# Patient Record
Sex: MALE | Race: BLACK OR AFRICAN AMERICAN | Employment: UNEMPLOYED | URBAN - METROPOLITAN AREA
[De-identification: names, ages, dates, MRNs, and addresses within clinical notes are randomized per-mention and may not be internally consistent; named-entity substitution may affect disease eponyms.]

---

## 2019-01-24 ENCOUNTER — HOSPITAL ENCOUNTER (OUTPATIENT)
Age: 24
Setting detail: SPECIMEN
Discharge: HOME | End: 2019-01-24
Payer: MEDICARE

## 2019-01-24 LAB
ABSOLUTE EOS #: 0.18 K/UL (ref 0–0.44)
ABSOLUTE IMMATURE GRANULOCYTE: <0.03 K/UL (ref 0–0.3)
ABSOLUTE LYMPH #: 1.77 K/UL (ref 1.1–3.7)
ABSOLUTE MONO #: 0.67 K/UL (ref 0.1–1.2)
ALBUMIN SERPL-MCNC: 4.7 G/DL (ref 3.5–5.2)
ALBUMIN/GLOBULIN RATIO: 1.6 (ref 1–2.5)
ALP BLD-CCNC: 82 U/L (ref 40–129)
ALT SERPL-CCNC: 27 U/L (ref 5–41)
ANION GAP SERPL CALCULATED.3IONS-SCNC: 15 MMOL/L (ref 9–17)
AST SERPL-CCNC: 18 U/L
BASOPHILS # BLD: 1 % (ref 0–2)
BASOPHILS ABSOLUTE: 0.05 K/UL (ref 0–0.2)
BILIRUB SERPL-MCNC: 0.51 MG/DL (ref 0.3–1.2)
BUN BLDV-MCNC: 10 MG/DL (ref 6–20)
BUN/CREAT BLD: NORMAL (ref 9–20)
CALCIUM SERPL-MCNC: 9.4 MG/DL (ref 8.6–10.4)
CHLORIDE BLD-SCNC: 103 MMOL/L (ref 98–107)
CHOLESTEROL/HDL RATIO: 4.5
CHOLESTEROL: 159 MG/DL
CO2: 25 MMOL/L (ref 20–31)
CREAT SERPL-MCNC: 0.99 MG/DL (ref 0.7–1.2)
DIFFERENTIAL TYPE: ABNORMAL
EOSINOPHILS RELATIVE PERCENT: 2 % (ref 1–4)
GFR AFRICAN AMERICAN: >60 ML/MIN
GFR NON-AFRICAN AMERICAN: >60 ML/MIN
GFR SERPL CREATININE-BSD FRML MDRD: NORMAL ML/MIN/{1.73_M2}
GFR SERPL CREATININE-BSD FRML MDRD: NORMAL ML/MIN/{1.73_M2}
GLUCOSE BLD-MCNC: 80 MG/DL (ref 70–99)
HCT VFR BLD CALC: 46.3 % (ref 40.7–50.3)
HDLC SERPL-MCNC: 35 MG/DL
HEMOGLOBIN: 14.7 G/DL (ref 13–17)
HEPATITIS C ANTIBODY: NONREACTIVE
IMMATURE GRANULOCYTES: 0 %
LDL CHOLESTEROL: 110 MG/DL (ref 0–130)
LYMPHOCYTES # BLD: 20 % (ref 24–43)
MCH RBC QN AUTO: 29.4 PG (ref 25.2–33.5)
MCHC RBC AUTO-ENTMCNC: 31.7 G/DL (ref 28.4–34.8)
MCV RBC AUTO: 92.6 FL (ref 82.6–102.9)
MONOCYTES # BLD: 8 % (ref 3–12)
NRBC AUTOMATED: 0 PER 100 WBC
PDW BLD-RTO: 13.4 % (ref 11.8–14.4)
PLATELET # BLD: 273 K/UL (ref 138–453)
PLATELET ESTIMATE: ABNORMAL
PMV BLD AUTO: 10 FL (ref 8.1–13.5)
POTASSIUM SERPL-SCNC: 4.2 MMOL/L (ref 3.7–5.3)
RBC # BLD: 5 M/UL (ref 4.21–5.77)
RBC # BLD: ABNORMAL 10*6/UL
SEDIMENTATION RATE, ERYTHROCYTE: 9 MM (ref 0–10)
SEG NEUTROPHILS: 69 % (ref 36–65)
SEGMENTED NEUTROPHILS ABSOLUTE COUNT: 5.97 K/UL (ref 1.5–8.1)
SODIUM BLD-SCNC: 143 MMOL/L (ref 135–144)
TOTAL CK: 356 U/L (ref 39–308)
TOTAL PROTEIN: 7.6 G/DL (ref 6.4–8.3)
TRIGL SERPL-MCNC: 70 MG/DL
TSH SERPL DL<=0.05 MIU/L-ACNC: 1.14 MIU/L (ref 0.3–5)
VLDLC SERPL CALC-MCNC: ABNORMAL MG/DL (ref 1–30)
WBC # BLD: 8.7 K/UL (ref 3.5–11.3)
WBC # BLD: ABNORMAL 10*3/UL

## 2019-01-25 LAB
ANTI-NUCLEAR ANTIBODY (ANA): NEGATIVE
CK MB: 1.5 NG/ML
RHEUMATOID FACTOR: 10.9 IU/ML

## 2019-01-29 LAB
DIRECT EXAM: NORMAL
Lab: NORMAL
SPECIMEN DESCRIPTION: NORMAL
STATUS: NORMAL

## 2020-06-03 ENCOUNTER — HOSPITAL ENCOUNTER (EMERGENCY)
Age: 25
Discharge: HOME | End: 2020-06-03
Payer: MEDICAID

## 2020-06-03 VITALS
BODY MASS INDEX: 32.45 KG/M2 | RESPIRATION RATE: 18 BRPM | HEIGHT: 75 IN | WEIGHT: 261 LBS | DIASTOLIC BLOOD PRESSURE: 84 MMHG | SYSTOLIC BLOOD PRESSURE: 156 MMHG | OXYGEN SATURATION: 97 % | HEART RATE: 96 BPM | TEMPERATURE: 97.1 F

## 2020-06-03 LAB
GROUP A STREP CULTURE, REFLEX: POSITIVE
REFLEX THROAT C + S: NORMAL

## 2020-06-03 PROCEDURE — 99213 OFFICE O/P EST LOW 20 MIN: CPT

## 2020-06-03 PROCEDURE — 87880 STREP A ASSAY W/OPTIC: CPT

## 2020-06-03 PROCEDURE — 6370000000 HC RX 637 (ALT 250 FOR IP): Performed by: NURSE PRACTITIONER

## 2020-06-03 PROCEDURE — 99202 OFFICE O/P NEW SF 15 MIN: CPT | Performed by: NURSE PRACTITIONER

## 2020-06-03 RX ORDER — AZELASTINE 1 MG/ML
1 SPRAY, METERED NASAL 2 TIMES DAILY
Qty: 1 BOTTLE | Refills: 0 | Status: SHIPPED | OUTPATIENT
Start: 2020-06-03 | End: 2020-06-22 | Stop reason: ALTCHOICE

## 2020-06-03 RX ORDER — AZITHROMYCIN 250 MG/1
TABLET, FILM COATED ORAL
Qty: 6 TABLET | Refills: 0 | Status: ON HOLD | OUTPATIENT
Start: 2020-06-03 | End: 2020-06-07

## 2020-06-03 RX ORDER — LIDOCAINE HYDROCHLORIDE 20 MG/ML
15 SOLUTION OROPHARYNGEAL ONCE
Status: COMPLETED | OUTPATIENT
Start: 2020-06-03 | End: 2020-06-03

## 2020-06-03 RX ORDER — PHENIRAMINE/PHENYLEPHRINE/DM 20-10-20MG
POWDER IN PACKET (EA) ORAL DAILY PRN
Status: ON HOLD | COMMUNITY
End: 2020-06-10 | Stop reason: HOSPADM

## 2020-06-03 RX ORDER — ACETAMINOPHEN 500 MG
500 TABLET ORAL EVERY 6 HOURS PRN
Qty: 30 TABLET | Refills: 0 | Status: SHIPPED | OUTPATIENT
Start: 2020-06-03

## 2020-06-03 RX ADMIN — IBUPROFEN 200 MG: 200 SUSPENSION ORAL at 10:44

## 2020-06-03 RX ADMIN — LIDOCAINE HYDROCHLORIDE 15 ML: 20 SOLUTION ORAL; TOPICAL at 10:44

## 2020-06-03 ASSESSMENT — ENCOUNTER SYMPTOMS
NAUSEA: 0
ABDOMINAL DISTENTION: 0
VOMITING: 0
SINUS PAIN: 0
RHINORRHEA: 0
CONSTIPATION: 0
SHORTNESS OF BREATH: 0
ABDOMINAL PAIN: 0
COUGH: 0
SINUS PRESSURE: 0
VOICE CHANGE: 0
DIARRHEA: 0
CHOKING: 0
BLOOD IN STOOL: 0
APNEA: 0
ANAL BLEEDING: 0
STRIDOR: 0
SWOLLEN GLANDS: 1
WHEEZING: 0
CHEST TIGHTNESS: 0
TROUBLE SWALLOWING: 1
SORE THROAT: 1

## 2020-06-03 ASSESSMENT — PAIN SCALES - GENERAL: PAINLEVEL_OUTOF10: 8

## 2020-06-03 ASSESSMENT — PAIN DESCRIPTION - PAIN TYPE: TYPE: ACUTE PAIN

## 2020-06-03 ASSESSMENT — PAIN DESCRIPTION - DESCRIPTORS: DESCRIPTORS: ACHING;SORE

## 2020-06-03 ASSESSMENT — ACTIVITIES OF DAILY LIVING (ADL): EFFECT OF PAIN ON DAILY ACTIVITIES: HURTS TO SWALLOW

## 2020-06-03 ASSESSMENT — PAIN DESCRIPTION - FREQUENCY: FREQUENCY: INTERMITTENT

## 2020-06-03 ASSESSMENT — PAIN DESCRIPTION - LOCATION: LOCATION: THROAT

## 2020-06-03 NOTE — ED NOTES
Discharge instructions and prescriptions reviewed with pt. Pt verbalized understanding. Pt ambulated out in stable condition. Assessment unchanged upon discharge.      Matilde Lisa RN  06/03/20 7895

## 2020-06-04 ENCOUNTER — CARE COORDINATION (OUTPATIENT)
Dept: CARE COORDINATION | Age: 25
End: 2020-06-04

## 2020-06-06 ENCOUNTER — CARE COORDINATION (OUTPATIENT)
Dept: OTHER | Facility: CLINIC | Age: 25
End: 2020-06-06

## 2020-06-06 NOTE — CARE COORDINATION
8    Additional Resources: You may be feeling anxious, depressed or overwhelmed. If you urgently need to talk or  text with someone, please reach out to the following resources:  Contact [employer] [phone] for links to local resources or support. Use code BSM[facility]1. https://Prepmatic2. Contour/portal/welcome/sso     Call The [employer] Administration's Disaster  Distress Helpline: 2-399-755-744  [facility] Line: 3-123.515.2473    Text  TalkWithUs to 05307 (Krystal Martinez: [phone]), or  TALK to 214103

## 2020-06-07 ENCOUNTER — ANESTHESIA EVENT (OUTPATIENT)
Dept: OPERATING ROOM | Age: 25
DRG: 097 | End: 2020-06-07
Payer: MEDICAID

## 2020-06-07 ENCOUNTER — HOSPITAL ENCOUNTER (OUTPATIENT)
Dept: CT IMAGING | Age: 25
Discharge: HOME | End: 2020-06-07

## 2020-06-07 ENCOUNTER — HOSPITAL ENCOUNTER (INPATIENT)
Age: 25
LOS: 3 days | Discharge: HOME | DRG: 097 | End: 2020-06-10
Attending: OTOLARYNGOLOGY | Admitting: OTOLARYNGOLOGY
Payer: MEDICAID

## 2020-06-07 ENCOUNTER — ANESTHESIA (OUTPATIENT)
Dept: OPERATING ROOM | Age: 25
DRG: 097 | End: 2020-06-07
Payer: MEDICAID

## 2020-06-07 VITALS — SYSTOLIC BLOOD PRESSURE: 114 MMHG | OXYGEN SATURATION: 100 % | DIASTOLIC BLOOD PRESSURE: 57 MMHG

## 2020-06-07 PROBLEM — Z90.89 S/P TONSILLECTOMY: Status: ACTIVE | Noted: 2020-06-07

## 2020-06-07 PROCEDURE — 2060000000 HC ICU INTERMEDIATE R&B

## 2020-06-07 PROCEDURE — 42826 REMOVAL OF TONSILS: CPT | Performed by: OTOLARYNGOLOGY

## 2020-06-07 PROCEDURE — 6360000002 HC RX W HCPCS: Performed by: NURSE ANESTHETIST, CERTIFIED REGISTERED

## 2020-06-07 PROCEDURE — 0C9PXZZ DRAINAGE OF TONSILS, EXTERNAL APPROACH: ICD-10-PCS | Performed by: OTOLARYNGOLOGY

## 2020-06-07 PROCEDURE — 42700 I&D ABSCESS PERITONSILLAR: CPT | Performed by: OTOLARYNGOLOGY

## 2020-06-07 PROCEDURE — 6360000002 HC RX W HCPCS: Performed by: ANESTHESIOLOGY

## 2020-06-07 PROCEDURE — 3600000002 HC SURGERY LEVEL 2 BASE: Performed by: OTOLARYNGOLOGY

## 2020-06-07 PROCEDURE — 3600000012 HC SURGERY LEVEL 2 ADDTL 15MIN: Performed by: OTOLARYNGOLOGY

## 2020-06-07 PROCEDURE — 2709999900 HC NON-CHARGEABLE SUPPLY: Performed by: OTOLARYNGOLOGY

## 2020-06-07 PROCEDURE — 87205 SMEAR GRAM STAIN: CPT

## 2020-06-07 PROCEDURE — 2500000003 HC RX 250 WO HCPCS: Performed by: NURSE ANESTHETIST, CERTIFIED REGISTERED

## 2020-06-07 PROCEDURE — 7100000001 HC PACU RECOVERY - ADDTL 15 MIN: Performed by: OTOLARYNGOLOGY

## 2020-06-07 PROCEDURE — 7100000000 HC PACU RECOVERY - FIRST 15 MIN: Performed by: OTOLARYNGOLOGY

## 2020-06-07 PROCEDURE — 2709999900 HC NON-CHARGEABLE SUPPLY

## 2020-06-07 PROCEDURE — 6360000002 HC RX W HCPCS: Performed by: PHYSICIAN ASSISTANT

## 2020-06-07 PROCEDURE — 3700000000 HC ANESTHESIA ATTENDED CARE: Performed by: OTOLARYNGOLOGY

## 2020-06-07 PROCEDURE — 3209999900 CT COMPARISON OF OUTSIDE FILMS

## 2020-06-07 PROCEDURE — 3700000001 HC ADD 15 MINUTES (ANESTHESIA): Performed by: OTOLARYNGOLOGY

## 2020-06-07 PROCEDURE — 88304 TISSUE EXAM BY PATHOLOGIST: CPT

## 2020-06-07 PROCEDURE — 87070 CULTURE OTHR SPECIMN AEROBIC: CPT

## 2020-06-07 PROCEDURE — 2580000003 HC RX 258: Performed by: PHYSICIAN ASSISTANT

## 2020-06-07 PROCEDURE — 99222 1ST HOSP IP/OBS MODERATE 55: CPT | Performed by: PHYSICIAN ASSISTANT

## 2020-06-07 PROCEDURE — 87077 CULTURE AEROBIC IDENTIFY: CPT

## 2020-06-07 PROCEDURE — 2580000003 HC RX 258: Performed by: NURSE ANESTHETIST, CERTIFIED REGISTERED

## 2020-06-07 PROCEDURE — 87075 CULTR BACTERIA EXCEPT BLOOD: CPT

## 2020-06-07 PROCEDURE — 2720000010 HC SURG SUPPLY STERILE: Performed by: OTOLARYNGOLOGY

## 2020-06-07 PROCEDURE — 0CTPXZZ RESECTION OF TONSILS, EXTERNAL APPROACH: ICD-10-PCS | Performed by: OTOLARYNGOLOGY

## 2020-06-07 PROCEDURE — 99284 EMERGENCY DEPT VISIT MOD MDM: CPT

## 2020-06-07 RX ORDER — LABETALOL 20 MG/4 ML (5 MG/ML) INTRAVENOUS SYRINGE
5 EVERY 10 MIN PRN
Status: DISCONTINUED | OUTPATIENT
Start: 2020-06-07 | End: 2020-06-07 | Stop reason: HOSPADM

## 2020-06-07 RX ORDER — FENTANYL CITRATE 50 UG/ML
25 INJECTION, SOLUTION INTRAMUSCULAR; INTRAVENOUS EVERY 5 MIN PRN
Status: DISCONTINUED | OUTPATIENT
Start: 2020-06-07 | End: 2020-06-07 | Stop reason: HOSPADM

## 2020-06-07 RX ORDER — MEPERIDINE HYDROCHLORIDE 25 MG/ML
INJECTION INTRAMUSCULAR; INTRAVENOUS; SUBCUTANEOUS
Status: DISPENSED
Start: 2020-06-07 | End: 2020-06-08

## 2020-06-07 RX ORDER — FENTANYL CITRATE 50 UG/ML
50 INJECTION, SOLUTION INTRAMUSCULAR; INTRAVENOUS EVERY 5 MIN PRN
Status: DISCONTINUED | OUTPATIENT
Start: 2020-06-07 | End: 2020-06-07 | Stop reason: HOSPADM

## 2020-06-07 RX ORDER — GLYCOPYRROLATE 1 MG/5 ML
SYRINGE (ML) INTRAVENOUS PRN
Status: DISCONTINUED | OUTPATIENT
Start: 2020-06-07 | End: 2020-06-07 | Stop reason: SDUPTHER

## 2020-06-07 RX ORDER — ONDANSETRON 2 MG/ML
INJECTION INTRAMUSCULAR; INTRAVENOUS PRN
Status: DISCONTINUED | OUTPATIENT
Start: 2020-06-07 | End: 2020-06-07 | Stop reason: SDUPTHER

## 2020-06-07 RX ORDER — FENTANYL CITRATE 50 UG/ML
INJECTION, SOLUTION INTRAMUSCULAR; INTRAVENOUS PRN
Status: DISCONTINUED | OUTPATIENT
Start: 2020-06-07 | End: 2020-06-07 | Stop reason: SDUPTHER

## 2020-06-07 RX ORDER — SODIUM CHLORIDE, SODIUM LACTATE, POTASSIUM CHLORIDE, CALCIUM CHLORIDE 600; 310; 30; 20 MG/100ML; MG/100ML; MG/100ML; MG/100ML
INJECTION, SOLUTION INTRAVENOUS CONTINUOUS PRN
Status: DISCONTINUED | OUTPATIENT
Start: 2020-06-07 | End: 2020-06-07 | Stop reason: SDUPTHER

## 2020-06-07 RX ORDER — MIDAZOLAM HYDROCHLORIDE 1 MG/ML
INJECTION INTRAMUSCULAR; INTRAVENOUS PRN
Status: DISCONTINUED | OUTPATIENT
Start: 2020-06-07 | End: 2020-06-07 | Stop reason: SDUPTHER

## 2020-06-07 RX ORDER — PROPOFOL 10 MG/ML
INJECTION, EMULSION INTRAVENOUS PRN
Status: DISCONTINUED | OUTPATIENT
Start: 2020-06-07 | End: 2020-06-07 | Stop reason: SDUPTHER

## 2020-06-07 RX ORDER — DEXAMETHASONE SODIUM PHOSPHATE 4 MG/ML
8 INJECTION, SOLUTION INTRA-ARTICULAR; INTRALESIONAL; INTRAMUSCULAR; INTRAVENOUS; SOFT TISSUE EVERY 12 HOURS
Status: COMPLETED | OUTPATIENT
Start: 2020-06-07 | End: 2020-06-08

## 2020-06-07 RX ORDER — ROCURONIUM BROMIDE 10 MG/ML
INJECTION, SOLUTION INTRAVENOUS PRN
Status: DISCONTINUED | OUTPATIENT
Start: 2020-06-07 | End: 2020-06-07 | Stop reason: SDUPTHER

## 2020-06-07 RX ORDER — LIDOCAINE HYDROCHLORIDE 20 MG/ML
INJECTION, SOLUTION INTRAVENOUS PRN
Status: DISCONTINUED | OUTPATIENT
Start: 2020-06-07 | End: 2020-06-07 | Stop reason: SDUPTHER

## 2020-06-07 RX ORDER — SUCCINYLCHOLINE/SOD CL,ISO/PF 200MG/10ML
SYRINGE (ML) INTRAVENOUS PRN
Status: DISCONTINUED | OUTPATIENT
Start: 2020-06-07 | End: 2020-06-07 | Stop reason: SDUPTHER

## 2020-06-07 RX ORDER — ONDANSETRON 2 MG/ML
4 INJECTION INTRAMUSCULAR; INTRAVENOUS
Status: DISCONTINUED | OUTPATIENT
Start: 2020-06-07 | End: 2020-06-07 | Stop reason: HOSPADM

## 2020-06-07 RX ORDER — SODIUM CHLORIDE 9 MG/ML
INJECTION, SOLUTION INTRAVENOUS CONTINUOUS
Status: DISCONTINUED | OUTPATIENT
Start: 2020-06-07 | End: 2020-06-07

## 2020-06-07 RX ORDER — PROMETHAZINE HYDROCHLORIDE 25 MG/ML
6.25 INJECTION, SOLUTION INTRAMUSCULAR; INTRAVENOUS
Status: DISCONTINUED | OUTPATIENT
Start: 2020-06-07 | End: 2020-06-07 | Stop reason: HOSPADM

## 2020-06-07 RX ORDER — SODIUM CHLORIDE 9 MG/ML
INJECTION, SOLUTION INTRAVENOUS CONTINUOUS
Status: DISCONTINUED | OUTPATIENT
Start: 2020-06-07 | End: 2020-06-10 | Stop reason: HOSPADM

## 2020-06-07 RX ORDER — DEXAMETHASONE SODIUM PHOSPHATE 4 MG/ML
INJECTION, SOLUTION INTRA-ARTICULAR; INTRALESIONAL; INTRAMUSCULAR; INTRAVENOUS; SOFT TISSUE PRN
Status: DISCONTINUED | OUTPATIENT
Start: 2020-06-07 | End: 2020-06-07 | Stop reason: SDUPTHER

## 2020-06-07 RX ORDER — VANCOMYCIN HYDROCHLORIDE 1 G/20ML
INJECTION, POWDER, LYOPHILIZED, FOR SOLUTION INTRAVENOUS PRN
Status: DISCONTINUED | OUTPATIENT
Start: 2020-06-07 | End: 2020-06-07 | Stop reason: SDUPTHER

## 2020-06-07 RX ORDER — MEPERIDINE HYDROCHLORIDE 25 MG/ML
12.5 INJECTION INTRAMUSCULAR; INTRAVENOUS; SUBCUTANEOUS EVERY 5 MIN PRN
Status: DISCONTINUED | OUTPATIENT
Start: 2020-06-07 | End: 2020-06-07 | Stop reason: HOSPADM

## 2020-06-07 RX ADMIN — Medication 0.4 MG: at 14:26

## 2020-06-07 RX ADMIN — ONDANSETRON HYDROCHLORIDE 4 MG: 4 INJECTION, SOLUTION INTRAMUSCULAR; INTRAVENOUS at 14:33

## 2020-06-07 RX ADMIN — ROCURONIUM BROMIDE 20 MG: 10 INJECTION INTRAVENOUS at 14:49

## 2020-06-07 RX ADMIN — MEPERIDINE HYDROCHLORIDE 12.5 MG: 25 INJECTION INTRAMUSCULAR; INTRAVENOUS; SUBCUTANEOUS at 16:05

## 2020-06-07 RX ADMIN — MIDAZOLAM HYDROCHLORIDE 2 MG: 1 INJECTION, SOLUTION INTRAMUSCULAR; INTRAVENOUS at 14:15

## 2020-06-07 RX ADMIN — DEXAMETHASONE SODIUM PHOSPHATE 8 MG: 4 INJECTION, SOLUTION INTRA-ARTICULAR; INTRALESIONAL; INTRAMUSCULAR; INTRAVENOUS; SOFT TISSUE at 18:47

## 2020-06-07 RX ADMIN — FENTANYL CITRATE 100 MCG: 50 INJECTION, SOLUTION INTRAMUSCULAR; INTRAVENOUS at 14:18

## 2020-06-07 RX ADMIN — DEXAMETHASONE SODIUM PHOSPHATE 8 MG: 4 INJECTION, SOLUTION INTRAMUSCULAR; INTRAVENOUS at 14:33

## 2020-06-07 RX ADMIN — SODIUM CHLORIDE: 9 INJECTION, SOLUTION INTRAVENOUS at 21:09

## 2020-06-07 RX ADMIN — SODIUM CHLORIDE: 9 INJECTION, SOLUTION INTRAVENOUS at 17:41

## 2020-06-07 RX ADMIN — ROCURONIUM BROMIDE 30 MG: 10 INJECTION INTRAVENOUS at 14:27

## 2020-06-07 RX ADMIN — LIDOCAINE HYDROCHLORIDE 100 MG: 20 INJECTION, SOLUTION INTRAVENOUS at 14:18

## 2020-06-07 RX ADMIN — FENTANYL CITRATE 100 MCG: 50 INJECTION, SOLUTION INTRAMUSCULAR; INTRAVENOUS at 14:42

## 2020-06-07 RX ADMIN — SUGAMMADEX 200 MG: 100 INJECTION, SOLUTION INTRAVENOUS at 15:46

## 2020-06-07 RX ADMIN — AMPICILLIN SODIUM AND SULBACTAM SODIUM 3 G: 2; 1 INJECTION, POWDER, FOR SOLUTION INTRAMUSCULAR; INTRAVENOUS at 17:40

## 2020-06-07 RX ADMIN — HYDROMORPHONE HYDROCHLORIDE 2 MG: 1 INJECTION, SOLUTION INTRAMUSCULAR; INTRAVENOUS; SUBCUTANEOUS at 21:32

## 2020-06-07 RX ADMIN — MEPERIDINE HYDROCHLORIDE 12.5 MG: 25 INJECTION INTRAMUSCULAR; INTRAVENOUS; SUBCUTANEOUS at 16:10

## 2020-06-07 RX ADMIN — HYDROMORPHONE HYDROCHLORIDE 0.5 MG: 1 INJECTION, SOLUTION INTRAMUSCULAR; INTRAVENOUS; SUBCUTANEOUS at 18:47

## 2020-06-07 RX ADMIN — PROPOFOL 200 MG: 10 INJECTION, EMULSION INTRAVENOUS at 14:18

## 2020-06-07 RX ADMIN — SODIUM CHLORIDE, POTASSIUM CHLORIDE, SODIUM LACTATE AND CALCIUM CHLORIDE: 600; 310; 30; 20 INJECTION, SOLUTION INTRAVENOUS at 14:15

## 2020-06-07 RX ADMIN — Medication 200 MG: at 14:18

## 2020-06-07 RX ADMIN — VANCOMYCIN HYDROCHLORIDE 1000 MG: 1 INJECTION, POWDER, LYOPHILIZED, FOR SOLUTION INTRAVENOUS at 14:30

## 2020-06-07 RX ADMIN — HYDROMORPHONE HYDROCHLORIDE 0.5 MG: 1 INJECTION, SOLUTION INTRAMUSCULAR; INTRAVENOUS; SUBCUTANEOUS at 16:57

## 2020-06-07 ASSESSMENT — PULMONARY FUNCTION TESTS
PIF_VALUE: 19
PIF_VALUE: 16
PIF_VALUE: 18
PIF_VALUE: 17
PIF_VALUE: 16
PIF_VALUE: 2
PIF_VALUE: 18
PIF_VALUE: 17
PIF_VALUE: 18
PIF_VALUE: 17
PIF_VALUE: 18
PIF_VALUE: 19
PIF_VALUE: 18
PIF_VALUE: 16
PIF_VALUE: 18
PIF_VALUE: 17
PIF_VALUE: 17
PIF_VALUE: 18
PIF_VALUE: 17
PIF_VALUE: 14
PIF_VALUE: 7
PIF_VALUE: 18
PIF_VALUE: 18
PIF_VALUE: 1
PIF_VALUE: 16
PIF_VALUE: 18
PIF_VALUE: 15
PIF_VALUE: 17
PIF_VALUE: 18
PIF_VALUE: 16
PIF_VALUE: 18
PIF_VALUE: 15
PIF_VALUE: 17
PIF_VALUE: 2
PIF_VALUE: 18
PIF_VALUE: 16
PIF_VALUE: 16
PIF_VALUE: 8
PIF_VALUE: 18
PIF_VALUE: 18
PIF_VALUE: 16
PIF_VALUE: 18
PIF_VALUE: 17
PIF_VALUE: 18
PIF_VALUE: 19
PIF_VALUE: 17
PIF_VALUE: 18
PIF_VALUE: 4
PIF_VALUE: 18
PIF_VALUE: 17
PIF_VALUE: 18
PIF_VALUE: 17
PIF_VALUE: 1
PIF_VALUE: 18
PIF_VALUE: 17
PIF_VALUE: 18
PIF_VALUE: 18
PIF_VALUE: 21
PIF_VALUE: 18
PIF_VALUE: 1
PIF_VALUE: 18
PIF_VALUE: 17
PIF_VALUE: 17
PIF_VALUE: 18
PIF_VALUE: 16
PIF_VALUE: 18
PIF_VALUE: 17

## 2020-06-07 ASSESSMENT — PAIN DESCRIPTION - ONSET
ONSET: ON-GOING
ONSET: AWAKENED FROM SLEEP
ONSET: ON-GOING
ONSET: ON-GOING

## 2020-06-07 ASSESSMENT — PAIN - FUNCTIONAL ASSESSMENT
PAIN_FUNCTIONAL_ASSESSMENT: PREVENTS OR INTERFERES WITH MANY ACTIVE NOT PASSIVE ACTIVITIES
PAIN_FUNCTIONAL_ASSESSMENT: PREVENTS OR INTERFERES SOME ACTIVE ACTIVITIES AND ADLS
PAIN_FUNCTIONAL_ASSESSMENT: ACTIVITIES ARE NOT PREVENTED

## 2020-06-07 ASSESSMENT — ENCOUNTER SYMPTOMS
VOMITING: 0
NAUSEA: 0
SHORTNESS OF BREATH: 0
ABDOMINAL PAIN: 0
SORE THROAT: 1
TROUBLE SWALLOWING: 1
VOICE CHANGE: 0
COUGH: 0
RHINORRHEA: 0
PHOTOPHOBIA: 0
FACIAL SWELLING: 0

## 2020-06-07 ASSESSMENT — PAIN DESCRIPTION - LOCATION
LOCATION: THROAT

## 2020-06-07 ASSESSMENT — PAIN DESCRIPTION - PAIN TYPE
TYPE: ACUTE PAIN
TYPE: SURGICAL PAIN

## 2020-06-07 ASSESSMENT — PAIN DESCRIPTION - DESCRIPTORS
DESCRIPTORS: SORE
DESCRIPTORS: SORE;SHARP

## 2020-06-07 ASSESSMENT — PAIN DESCRIPTION - PROGRESSION
CLINICAL_PROGRESSION: NOT CHANGED
CLINICAL_PROGRESSION: NOT CHANGED
CLINICAL_PROGRESSION: GRADUALLY IMPROVING
CLINICAL_PROGRESSION: GRADUALLY WORSENING
CLINICAL_PROGRESSION: NOT CHANGED

## 2020-06-07 ASSESSMENT — PAIN SCALES - GENERAL
PAINLEVEL_OUTOF10: 7
PAINLEVEL_OUTOF10: 3
PAINLEVEL_OUTOF10: 7
PAINLEVEL_OUTOF10: 8
PAINLEVEL_OUTOF10: 7
PAINLEVEL_OUTOF10: 10

## 2020-06-07 ASSESSMENT — PAIN DESCRIPTION - ORIENTATION
ORIENTATION: POSTERIOR
ORIENTATION: RIGHT
ORIENTATION: POSTERIOR
ORIENTATION: POSTERIOR

## 2020-06-07 ASSESSMENT — PAIN DESCRIPTION - FREQUENCY
FREQUENCY: CONTINUOUS

## 2020-06-07 NOTE — ED PROVIDER NOTES
[facility] EMERGENCY DEPT      CHIEF COMPLAINT       Chief Complaint   Patient presents with    Abscess     Peritonsilar       Nurses Notes reviewed and I agree except as noted in the HPI. HISTORY OF PRESENT ILLNESS    Sarah Hernandez is a 26 y.o. male who presents from [facility] for the evaluation of a peritonilar abscess, rating his current pain 7/10 in severity. Patient states sore throat onset 4 days ago. He was then evaluated at Urgent Care, where he tested positive for strep throat. Patient was then given a prescription for a Z-pack, but states that he felt too ill to have the prescription filled. Patient states he then took an unknown antibiotic that he brother had at their house with no resolve of sore throat. Patient describes his sore throat as tightness in character. Patient then presented to [facility] for evaluation of unresolved sore throat. He had a CT wit IV contrast performed that noted a 1.6 cm right peritonilar abscess. He was given Solumedrol, 3 g of Unicen, Morphine, and Toradol. Patient was then brought to [address] [place] [address] ED because [facility] does not have an ENT on call. Patient's WBC was noted at 14.8 while at [facility]. Patient reports associated headache, decreased oral intake, fever, and chills upon evaluation today. He also reports difficulties swallowing when symptoms first onset, but states it has since resolved. Patient denies chest pain and  shortness of breath. Patient denies congestion, cough, ear pain, rhinorrhea, ear pain, facial swelling, and voice change. He denies abdominal pain, nausea, and vomiting. Patient has a past medical history that includes asthma. There are no other complaints, symptoms, or concerns on initial encounter.     Location/Symptom: sore throat with known 1.6 right peritonilar abscess  Timing/Onset: 4 days   Context/Setting: transfer from [facility]   Quality: tightness   Duration: constant   Modifying Factors: none stated   Severity: 7/10    REVIEW OF SYSTEMS     Review of Systems Constitutional: Positive for chills and fever. Negative for fatigue. Decreased oral intake    HENT: Positive for sore throat (tighness) and trouble swallowing (resolved). Negative for congestion, drooling, ear pain, facial swelling, rhinorrhea and voice change. Eyes: Negative for photophobia. Respiratory: Negative for cough and shortness of breath. Cardiovascular: Negative for chest pain. Gastrointestinal: Negative for abdominal pain, nausea and vomiting. Endocrine: Negative for polyuria. Genitourinary: Negative for decreased urine volume, difficulty urinating and dysuria. Musculoskeletal: Negative for myalgias and neck stiffness. Skin: Negative for rash. Neurological: Positive for headaches. Negative for dizziness and weakness. Hematological: Negative for adenopathy. Psychiatric/Behavioral: Negative for confusion and sleep disturbance. PAST MEDICAL HISTORY    has a past medical history of Asthma. SURGICAL HISTORY      has no past surgical history on file. CURRENT MEDICATIONS       Previous Medications    ACETAMINOPHEN (APAP EXTRA STRENGTH) 500 MG TABLET    Take 1 tablet by mouth every 6 hours as needed for Pain    AZELASTINE (ASTELIN) 0.1 % NASAL SPRAY    1 spray by Nasal route 2 times daily for 14 days Use in each nostril as directed    AZITHROMYCIN (ZITHROMAX Z-GONZALO) 250 MG TABLET    2 tablets day 1 then1 tablet days 2 - 5. MAGIC MOUTHWASH (MIRACLE MOUTHWASH)    Swish and spit 15 mLs 4 times daily as needed for Pain Benedryl  12.5mg/5ml     - 30ml  Maalox                                60ml  Viscous Lidocaine              40ml  If equal part premix is the only option it is acceptable    PHENYLEPHRINE-PHENIRAMINE-DM (THERAFLU COLD & COUGH) 10-20-20 MG PACK    Take by mouth    PSEUDOEPH-DOXYLAMINE-DM-APAP (NYQUIL PO)    Take by mouth       ALLERGIES     has No Known Allergies. FAMILY HISTORY     He indicated that his mother is alive.  He indicated that his father is decreased breath sounds or wheezing. Abdominal:      General: There is no distension. Palpations: Abdomen is soft. Abdomen is not rigid. Musculoskeletal: Normal range of motion. Comments: Movement normal as observed   Lymphadenopathy:      Head:      Right side of head: No submental, submandibular, tonsillar, preauricular or posterior auricular adenopathy. Left side of head: No submental, submandibular, tonsillar, preauricular or posterior auricular adenopathy. Cervical: No cervical adenopathy. Right cervical: No superficial, deep or posterior cervical adenopathy. Left cervical: No superficial, deep or posterior cervical adenopathy. Skin:     General: Skin is warm and dry. Coloration: Skin is not pale. Findings: No rash. Neurological:      General: No focal deficit present. Mental Status: He is alert and oriented to person, place, and time. Sensory: No sensory deficit. Gait: Gait normal.   Psychiatric:         Attention and Perception: Attention normal.         Mood and Affect: Mood normal.         Speech: Speech normal.         Behavior: Behavior normal. Behavior is cooperative. Thought Content: Thought content normal.         Cognition and Memory: Cognition normal.         DIFFERENTIAL DIAGNOSIS:   Including but not limited to: peritonilar abscess, deep space parapharyngeal infection, medication non-compliance, dehydration     DIAGNOSTIC RESULTS     EKG: All E[facility]'s are interpreted [facility] who either signs or Co-signs this chart in the absence of a cardiologist.  None     RADIOLOGY: non-plain film images(s) such as CT,Ultrasound and MRI are read by the radiologist.  Plain radiographic images are visualized and preliminarily interpreted by the emergency physician unless otherwise stated below.   No orders to display       LABS:   Labs Reviewed - No data to display    EMERGENCY DEPARTMENT COURSE:   Vitals:    Vitals: 06/07/20 1246 06/07/20 1340   BP: 134/85 127/88   Pulse: 74 73   Resp: 18 17   Temp: 98.3 °F (36.8 °C)    TempSrc: Oral    SpO2: 99% 98%   Weight: 260 lb (117.9 kg)      MDM:  The patient was seen by me in the emergency department in transfer from [facility] for evaluation by otolaryngology for right peritonsillar abscess seen on CT scan. Tootie Reis PA-C from otolaryngology was present at time of initial evaluation. Exam revealed a nontoxic-appearing 67-year-old male in no respiratory distress. He was managing own secretions and had bilateral tonsillar hypertrophy right greater than left with uvular deviation to the left. No labs or imaging were deemed necessary at this point as it was performed at [facility] and reviewed upon arrival.  Vital signs reviewed and noted to stable. After ENT evaluation of patient and CT scan it was advised patient will be taken to surgery. Patient was comfortable with plan and had already been given antibiotics and other medications at [facility]. Patient was stable and admitted to the hospital with impending surgery scheduled. CRITICAL CARE:   None    CONSULTS:  Tootie Reis PA-C (otolaryngology)    PROCEDURES:  None    FINAL IMPRESSION      1. Peritonsillar abscess          DISPOSITION/PLAN     1. Peritonsillar abscess    Admission      (Please note that portions of this note were completed with a voice recognition program.  Efforts were made to edit the dictations but occasionally words are mis-transcribed.)    Scribe:  Christoph Vines 6/7/20 1:14 PM EDT Scribing for and in the presence of Yoon Underwood PA-C. Signed by: Emile Emery 06/07/20 2:05 PM    Provider:  I personally performed the services described in the documentation, reviewed and edited the documentation which was dictated to the scribe in my presence, and it accurately records my words and actions.     Yoon Underwood PA-C 06/07/20 2:05 PM    Faustina Ryna,

## 2020-06-07 NOTE — ED TRIAGE NOTES
Pt to ER for evaluation of peritonilar abscess from [facility]. Pt noted with redness in throat and swelling R>L. pt styates he he has had sore throat for last 3 days and he has tested positive for strep throat at . Pt reports  He went to [facility] for further evaluation where CT scan revealed Peritonsillar abscess. Pt reports difficulty chewing and swallowing food. Adenike KELLY and Leann from ENT team at bedside evaluating pt. AVS summary reviewed per nursing and providers. Noted that pt has been administered, toradol, unasyn, morphine and solumedrol. Pt alert and oriented, resp easy and unlabored, call light within reach.

## 2020-06-07 NOTE — ANESTHESIA PRE PROCEDURE
80 01/24/2019    PROT 7.6 01/24/2019    CALCIUM 9.4 01/24/2019    BILITOT 0.51 01/24/2019    ALKPHOS 82 01/24/2019    AST 18 01/24/2019    ALT 27 01/24/2019       POC Tests: No results for input(s): POCGLU, POCNA, POCK, POCCL, POCBUN, POCHEMO, POCHCT in the last 72 hours. Coags:   Lab Results   Component Value Date    INR 1.09 02/14/2014    APTT 33.8 02/14/2014       HCG (If Applicable): No results found for: PREGTESTUR, PREGSERUM, HCG, HCGQUANT     ABGs: No results found for: PHART, PO2ART, DWW4GKC, JCC6SNI, BEART, F7YMYOGW     Type & Screen (If Applicable):  No results found for: LABABO, LABRH    Drug/Infectious Status (If Applicable):  Lab Results   Component Value Date    HEPCAB NONREACTIVE 01/24/2019       COVID-19 Screening (If Applicable): No results found for: COVID19      Anesthesia Evaluation   no history of anesthetic complications:   Airway: Mallampati: II  TM distance: >3 FB   Neck ROM: full  Mouth opening: > = 3 FB Dental:          Pulmonary:normal exam    (+) asthma:           Patient did not smoke on day of surgery. Cardiovascular:  Exercise tolerance: good (>4 METS),                     Neuro/Psych:   Negative Neuro/Psych ROS              GI/Hepatic/Renal: Neg GI/Hepatic/Renal ROS            Endo/Other: Negative Endo/Other ROS             Pt had no PAT visit       Abdominal:           Vascular: negative vascular ROS. Anesthesia Plan      general     ASA 2 - emergent       Induction: intravenous and rapid sequence. MIPS: Postoperative opioids intended and Prophylactic antiemetics administered. Anesthetic plan and risks discussed with patient. Plan discussed with CRNA.                   Nella Pratt MD   6/7/2020

## 2020-06-07 NOTE — ED NOTES
Bed: 001A  Expected date: 6/7/20  Expected time:   Means of arrival:   Comments:      Natalya Gilmore RN  06/07/20 2476

## 2020-06-07 NOTE — PROCEDURES
Indication for the procedure: Mr. Tiesha Vaughan is a 80-year-old male who describes less than a week history of progressive throat soreness resulting in an ER visit approximately 4 days ago complaining of worsening difficulty with pain and swallowing. He was sent home on oral antibiotics and return to the ER today complaining of still worsening symptoms. He was evaluated with a CT scan of the neck with contrast and found to have abscess fluid collection apparent in the right deep peritonsillar and neck compartments considered a surgical emergency. He was transferred from [facility] to [facility] for my evaluation and treatment. I reviewed his CT scan myself and determined that he had a deep extension of purulence into what appeared to be the carotid sheath potentially making his drainage per orally difficult one. As such I consented him for a possible bilateral tonsillectomy and a possible open I&D of a neck abscess should the indication arise intraoperatively. I reviewed with him the indications benefits limitations and risks of proceeding in this manner particularly as it relates to the risk of postoperative bleeding, which in this circumstance is high and the risk of worsening pain and need for strong intravenous pain medications because of the inflammation associated with the infection within which I would be operating. The patient reported understanding these other risks and wished to proceed. Informed consent was based on his conversations. Preop diagnosis #1 peritonsillar abscess; right  2. Chronic tonsillitis  Postoperative diagnosis: Same    Operation: 1. Quinsy tonsillectomy; bilateral  2. I&D of peritonsillar abscess    Anesthesia: General anesthesia by endotracheal tube and intravenous agents  Procedure: The patient was taken the operating room and placed in supine position.   General anesthesia was induced and the patient was intubated with a 7.5 endotracheal tube and the anesthesia team using a video laryngoscope to enable what would otherwise have been a very difficult intubation given the swelling evident on the video camera of his hypopharynx deep to the palate team.  There was no vital sign instability prior to securing of his airway. With the patient's airway secured to have the table turned 90 degrees for the procedure. I performed a timeout verifying the patient's identity and planned procedure as well as identifying everyone in the operating room and the role in the patient's care. I then placed a Erica-Omar oral retractor to the right of his tongue and extended in order to provide a broad view of his oropharynx. It was grossly abnormal for the presence of a very medially displaced large and highly irregular tonsil that appeared to be ballotable he sitting on top of a liquid accumulation, most likely pus. I injected some 1% lidocaine with 1 100,000 epinephrine along the superior pole mucosa of the distal membranous soft palate. I used a 12 blade to make a curved incision into this area where I would likely expect to find purulence and atypical peritonsillar abscess patient. I dissected into the space and found no pus. I made a second incision laterally and inferiorly through the distal anterior pillar and similarly dissected deep to the tonsil and again found no pus. Given the convincing findings on his CT scan as well as his clinical history I determined that the only way that I would be able to get to this process apart from an open dissection would be to remove the tonsil in the form of a Johnny tonsillectomy. Using a combination of Bovie cautery sharp and blunt dissection I incised the anterior pillar and distal membranous palate overlying the highly inflamed and displaced right palate and tonsil until I could discern tonsil tissue that I could grasp with a curved Allis forcep.   I retracted the tonsil medially and distally so as to visualize the deep surface of the tonsil as much as possible. He was difficult to stay in this plane because of the deep scar and the diffuse induration in the surrounding soft tissues. Bleeding was generous and required extensive monopolar and suction cautery. I also used bipolar cautery and a clip applier to ligate the feeding vessels particularly deep to the center of the tonsil and the inferior pole. Gradually reflected this tissue medially and distally and entered a pus pocket of dense scar and purulence which I took cultures for Gram stain and aerobic growth. I continued dissection distally until I reached the inferior pole whereupon I clipped the inferior pole vessels and reflected the resection medially. I amputated from the posterior pillar and handed off the field replaced in formalin for permanent section. I achieved hemostasis with additional suction cautery and clip placement. Of enter my attention to the left side. I similarly incised it but this time not having to take any of the distal soft palate mucosa since there was no additional incision point but had been made as part of the drainage attempt on the right side. I took this dissection to the deep space and found scarring at least as generous as was encountered and the acutely infected side. Also bled substantially during the dissection indicating to me that it was also uninfected focus of tissue. Gradually reflected off of the deep scarred muscular tube and incised the inferior pole attachments clipping the vessels through the central tonsil space and through the inferior pole to increase my chances of postoperative hemostasis. I reflected the tissue medially and distally and amputated off the posterior pillar. I suctioned bloody effluent and used suction cautery and additional clips to generate hemostasis.   I irrigated out the wound with copious amounts of sterile saline and have the anesthesia colleague perform a Valsalva maneuver on 3 separate

## 2020-06-08 LAB
BASOPHILS # BLD: 0.1 %
BASOPHILS ABSOLUTE: 0 THOU/MM3 (ref 0–0.1)
EOSINOPHIL # BLD: 0 %
EOSINOPHILS ABSOLUTE: 0 THOU/MM3 (ref 0–0.4)
ERYTHROCYTE [DISTWIDTH] IN BLOOD BY AUTOMATED COUNT: 13.3 % (ref 11.5–14.5)
ERYTHROCYTE [DISTWIDTH] IN BLOOD BY AUTOMATED COUNT: 45.3 FL (ref 35–45)
HCT VFR BLD CALC: 39.8 % (ref 42–52)
HEMOGLOBIN: 13 GM/DL (ref 14–18)
IMMATURE GRANS (ABS): 0.15 THOU/MM3 (ref 0–0.07)
IMMATURE GRANULOCYTES: 0.7 %
LYMPHOCYTES # BLD: 7.4 %
LYMPHOCYTES ABSOLUTE: 1.5 THOU/MM3 (ref 1–4.8)
MCH RBC QN AUTO: 30.2 PG (ref 26–33)
MCHC RBC AUTO-ENTMCNC: 32.7 GM/DL (ref 32.2–35.5)
MCV RBC AUTO: 92.6 FL (ref 80–94)
MONOCYTES # BLD: 7.4 %
MONOCYTES ABSOLUTE: 1.5 THOU/MM3 (ref 0.4–1.3)
NUCLEATED RED BLOOD CELLS: 0 /100 WBC
PLATELET # BLD: 286 THOU/MM3 (ref 130–400)
PMV BLD AUTO: 9.7 FL (ref 9.4–12.4)
RBC # BLD: 4.3 MILL/MM3 (ref 4.7–6.1)
SEG NEUTROPHILS: 84.4 %
SEGMENTED NEUTROPHILS ABSOLUTE COUNT: 17.5 THOU/MM3 (ref 1.8–7.7)
WBC # BLD: 20.7 THOU/MM3 (ref 4.8–10.8)

## 2020-06-08 PROCEDURE — 2580000003 HC RX 258: Performed by: PHYSICIAN ASSISTANT

## 2020-06-08 PROCEDURE — 85025 COMPLETE CBC W/AUTO DIFF WBC: CPT

## 2020-06-08 PROCEDURE — 6360000002 HC RX W HCPCS: Performed by: PHYSICIAN ASSISTANT

## 2020-06-08 PROCEDURE — 99024 POSTOP FOLLOW-UP VISIT: CPT | Performed by: PHYSICIAN ASSISTANT

## 2020-06-08 PROCEDURE — 94760 N-INVAS EAR/PLS OXIMETRY 1: CPT

## 2020-06-08 PROCEDURE — 1200000003 HC TELEMETRY R&B

## 2020-06-08 PROCEDURE — 36415 COLL VENOUS BLD VENIPUNCTURE: CPT

## 2020-06-08 PROCEDURE — 6370000000 HC RX 637 (ALT 250 FOR IP): Performed by: PHYSICIAN ASSISTANT

## 2020-06-08 RX ORDER — ONDANSETRON 2 MG/ML
4 INJECTION INTRAMUSCULAR; INTRAVENOUS EVERY 6 HOURS PRN
Status: DISCONTINUED | OUTPATIENT
Start: 2020-06-08 | End: 2020-06-10 | Stop reason: HOSPADM

## 2020-06-08 RX ADMIN — HYDROMORPHONE HYDROCHLORIDE 2 MG: 1 INJECTION, SOLUTION INTRAMUSCULAR; INTRAVENOUS; SUBCUTANEOUS at 00:06

## 2020-06-08 RX ADMIN — DEXAMETHASONE SODIUM PHOSPHATE 8 MG: 4 INJECTION, SOLUTION INTRA-ARTICULAR; INTRALESIONAL; INTRAMUSCULAR; INTRAVENOUS; SOFT TISSUE at 05:23

## 2020-06-08 RX ADMIN — HYDROMORPHONE HYDROCHLORIDE 2 MG: 1 INJECTION, SOLUTION INTRAMUSCULAR; INTRAVENOUS; SUBCUTANEOUS at 03:50

## 2020-06-08 RX ADMIN — ONDANSETRON HYDROCHLORIDE 4 MG: 2 SOLUTION INTRAMUSCULAR; INTRAVENOUS at 10:45

## 2020-06-08 RX ADMIN — HYDROCODONE BITARTRATE AND ACETAMINOPHEN 15 ML: 5; 217 SOLUTION ORAL at 21:32

## 2020-06-08 RX ADMIN — SODIUM CHLORIDE: 9 INJECTION, SOLUTION INTRAVENOUS at 03:50

## 2020-06-08 RX ADMIN — AMPICILLIN SODIUM AND SULBACTAM SODIUM 3 G: 2; 1 INJECTION, POWDER, FOR SOLUTION INTRAMUSCULAR; INTRAVENOUS at 00:06

## 2020-06-08 RX ADMIN — AMPICILLIN SODIUM AND SULBACTAM SODIUM 3 G: 2; 1 INJECTION, POWDER, FOR SOLUTION INTRAMUSCULAR; INTRAVENOUS at 16:36

## 2020-06-08 RX ADMIN — AMPICILLIN SODIUM AND SULBACTAM SODIUM 3 G: 2; 1 INJECTION, POWDER, FOR SOLUTION INTRAMUSCULAR; INTRAVENOUS at 09:15

## 2020-06-08 RX ADMIN — HYDROMORPHONE HYDROCHLORIDE 2 MG: 1 INJECTION, SOLUTION INTRAMUSCULAR; INTRAVENOUS; SUBCUTANEOUS at 07:36

## 2020-06-08 RX ADMIN — SODIUM CHLORIDE: 9 INJECTION, SOLUTION INTRAVENOUS at 16:36

## 2020-06-08 ASSESSMENT — PAIN DESCRIPTION - DESCRIPTORS
DESCRIPTORS: SORE
DESCRIPTORS: SORE

## 2020-06-08 ASSESSMENT — PAIN DESCRIPTION - ONSET
ONSET: ON-GOING
ONSET: ON-GOING

## 2020-06-08 ASSESSMENT — PAIN DESCRIPTION - FREQUENCY
FREQUENCY: CONTINUOUS
FREQUENCY: CONTINUOUS

## 2020-06-08 ASSESSMENT — PAIN - FUNCTIONAL ASSESSMENT
PAIN_FUNCTIONAL_ASSESSMENT: PREVENTS OR INTERFERES SOME ACTIVE ACTIVITIES AND ADLS
PAIN_FUNCTIONAL_ASSESSMENT: PREVENTS OR INTERFERES SOME ACTIVE ACTIVITIES AND ADLS

## 2020-06-08 ASSESSMENT — PAIN SCALES - GENERAL
PAINLEVEL_OUTOF10: 2
PAINLEVEL_OUTOF10: 7
PAINLEVEL_OUTOF10: 9
PAINLEVEL_OUTOF10: 8
PAINLEVEL_OUTOF10: 8
PAINLEVEL_OUTOF10: 2
PAINLEVEL_OUTOF10: 8
PAINLEVEL_OUTOF10: 6
PAINLEVEL_OUTOF10: 7

## 2020-06-08 ASSESSMENT — PAIN DESCRIPTION - PAIN TYPE
TYPE: SURGICAL PAIN
TYPE: SURGICAL PAIN

## 2020-06-08 ASSESSMENT — PAIN DESCRIPTION - PROGRESSION
CLINICAL_PROGRESSION: GRADUALLY WORSENING
CLINICAL_PROGRESSION: NOT CHANGED
CLINICAL_PROGRESSION: GRADUALLY IMPROVING
CLINICAL_PROGRESSION: GRADUALLY IMPROVING

## 2020-06-08 ASSESSMENT — ACTIVITIES OF DAILY LIVING (ADL): EFFECT OF PAIN ON DAILY ACTIVITIES: HURTS TO SWALLOW

## 2020-06-08 ASSESSMENT — PAIN DESCRIPTION - ORIENTATION
ORIENTATION: POSTERIOR
ORIENTATION: POSTERIOR

## 2020-06-08 ASSESSMENT — PAIN DESCRIPTION - LOCATION
LOCATION: THROAT
LOCATION: THROAT

## 2020-06-08 NOTE — CARE COORDINATION
6/8/20, 11:35 AM EDT  DISCHARGE PLANNING EVALUATION:    Yeni Dao       Admitted from: [facility] 6/7/2020/ [address] day: 1   Location: [address] [place] [address] Reason for admit: S/P tonsillectomy [Z90.89] Status: IP  Admit order signed?: yes  PMH:  has a past medical history of Asthma. Procedure: TONSILLECTOMY   Pertinent abnormal Imaging:none  Medications:  Scheduled Meds:   ampicillin-sulbactam  3 g Intravenous Q8H     Continuous Infusions:   sodium chloride 100 mL/hr at 06/08/20 0350      Pertinent Info/Orders/Treatment Plan:  IV fluids, IV antibiotics, telemetry, pain control. Diet: DIET DYSPHAGIA PUREED;   Smoking status:  reports that he has quit smoking. His smoking use included cigars. He has never used smokeless tobacco.   PCP: MARK Blackwell CNP  Readmission 30 days or less: no  Readmission Risk Score: 6%    Discharge Planning Evaluation  Current Residence:  Private Residence  Living Arrangements:  Spouse/Significant Other   Support Systems:  Spouse/Significant Other, Children  Current Services PTA:     Potential Assistance Needed:  N/A  Potential Assistance Purchasing Medications:  No  Does patient want to participate in local refill/ meds to beds program?  Yes  Type of Home Care Services:  None  Patient expects to be discharged to:  home  Expected Discharge date:  06/10/20  Follow Up Appointment: Best Day/ Time: Wednesday PM    Patient Goals/Plan/Treatment Preferences: Met with Blu Martinez. He currently lives at home with his significant other. Plan is to return home at discharge. He denies need for DME and declines HH. Transportation/Food Security/Housekeeping Addressed:  No issues identified.     Evaluation: no

## 2020-06-08 NOTE — PLAN OF CARE
discharge. Problem: Airway Clearance - Ineffective:  Goal: Ability to maintain a clear airway will improve  Description: Ability to maintain a clear airway will improve  Outcome: Ongoing  Note: Patient able to maintain a clear airway. No signs of aspiration noted. Problem: Aspiration:  Goal: Absence of aspiration  Description: Absence of aspiration  Outcome: Ongoing  Note: Patient able to maintain a clear airway. No signs of aspiration noted. Problem: Nutrition Deficit:  Goal: Ability to achieve adequate nutritional intake will improve  Description: Ability to achieve adequate nutritional intake will improve  Outcome: Ongoing  Note: Patient on a clear liquid diet. Patient tolerating fairly well. Problem: Bleeding:  Goal: Will show no signs and symptoms of excessive bleeding  Description: Will show no signs and symptoms of excessive bleeding  Outcome: Ongoing  Note: No signs or symptoms of bleeding noted. Will continue to monitor. Care plan reviewed with patient and family. Patient and family verbalize understanding of the plan of care and contribute to goal setting.

## 2020-06-09 LAB
BASOPHILS # BLD: 0.3 %
BASOPHILS ABSOLUTE: 0 THOU/MM3 (ref 0–0.1)
EOSINOPHIL # BLD: 0.3 %
EOSINOPHILS ABSOLUTE: 0 THOU/MM3 (ref 0–0.4)
ERYTHROCYTE [DISTWIDTH] IN BLOOD BY AUTOMATED COUNT: 13 % (ref 11.5–14.5)
ERYTHROCYTE [DISTWIDTH] IN BLOOD BY AUTOMATED COUNT: 44.1 FL (ref 35–45)
HCT VFR BLD CALC: 40.9 % (ref 42–52)
HEMOGLOBIN: 13 GM/DL (ref 14–18)
IMMATURE GRANS (ABS): 0.05 THOU/MM3 (ref 0–0.07)
IMMATURE GRANULOCYTES: 0.5 %
LYMPHOCYTES # BLD: 25.7 %
LYMPHOCYTES ABSOLUTE: 2.5 THOU/MM3 (ref 1–4.8)
MCH RBC QN AUTO: 29.7 PG (ref 26–33)
MCHC RBC AUTO-ENTMCNC: 31.8 GM/DL (ref 32.2–35.5)
MCV RBC AUTO: 93.6 FL (ref 80–94)
MONOCYTES # BLD: 7.9 %
MONOCYTES ABSOLUTE: 0.8 THOU/MM3 (ref 0.4–1.3)
NUCLEATED RED BLOOD CELLS: 0 /100 WBC
PLATELET # BLD: 276 THOU/MM3 (ref 130–400)
PMV BLD AUTO: 9.4 FL (ref 9.4–12.4)
RBC # BLD: 4.37 MILL/MM3 (ref 4.7–6.1)
SEG NEUTROPHILS: 65.3 %
SEGMENTED NEUTROPHILS ABSOLUTE COUNT: 6.5 THOU/MM3 (ref 1.8–7.7)
WBC # BLD: 9.9 THOU/MM3 (ref 4.8–10.8)

## 2020-06-09 PROCEDURE — 6360000002 HC RX W HCPCS: Performed by: PHYSICIAN ASSISTANT

## 2020-06-09 PROCEDURE — 2060000000 HC ICU INTERMEDIATE R&B

## 2020-06-09 PROCEDURE — 2580000003 HC RX 258: Performed by: NURSE PRACTITIONER

## 2020-06-09 PROCEDURE — 2580000003 HC RX 258: Performed by: PHYSICIAN ASSISTANT

## 2020-06-09 PROCEDURE — 6370000000 HC RX 637 (ALT 250 FOR IP): Performed by: NURSE PRACTITIONER

## 2020-06-09 PROCEDURE — 6360000002 HC RX W HCPCS: Performed by: NURSE PRACTITIONER

## 2020-06-09 PROCEDURE — 6370000000 HC RX 637 (ALT 250 FOR IP): Performed by: PHYSICIAN ASSISTANT

## 2020-06-09 PROCEDURE — 85025 COMPLETE CBC W/AUTO DIFF WBC: CPT

## 2020-06-09 PROCEDURE — 36415 COLL VENOUS BLD VENIPUNCTURE: CPT

## 2020-06-09 RX ORDER — NALOXONE HYDROCHLORIDE 0.4 MG/ML
INJECTION, SOLUTION INTRAMUSCULAR; INTRAVENOUS; SUBCUTANEOUS
Status: DISPENSED
Start: 2020-06-09 | End: 2020-06-10

## 2020-06-09 RX ORDER — KETOROLAC TROMETHAMINE 30 MG/ML
15 INJECTION, SOLUTION INTRAMUSCULAR; INTRAVENOUS EVERY 6 HOURS
Status: DISCONTINUED | OUTPATIENT
Start: 2020-06-09 | End: 2020-06-10 | Stop reason: HOSPADM

## 2020-06-09 RX ORDER — ACETAMINOPHEN 160 MG/5ML
1000 SOLUTION ORAL EVERY 6 HOURS PRN
Status: DISCONTINUED | OUTPATIENT
Start: 2020-06-09 | End: 2020-06-10 | Stop reason: HOSPADM

## 2020-06-09 RX ORDER — NALOXONE HYDROCHLORIDE 0.4 MG/ML
0.2 INJECTION, SOLUTION INTRAMUSCULAR; INTRAVENOUS; SUBCUTANEOUS ONCE
Status: COMPLETED | OUTPATIENT
Start: 2020-06-09 | End: 2020-06-09

## 2020-06-09 RX ADMIN — HYDROCODONE BITARTRATE AND ACETAMINOPHEN 15 ML: 5; 217 SOLUTION ORAL at 01:52

## 2020-06-09 RX ADMIN — AMPICILLIN SODIUM AND SULBACTAM SODIUM 3 G: 2; 1 INJECTION, POWDER, FOR SOLUTION INTRAMUSCULAR; INTRAVENOUS at 16:47

## 2020-06-09 RX ADMIN — SODIUM CHLORIDE: 9 INJECTION, SOLUTION INTRAVENOUS at 01:51

## 2020-06-09 RX ADMIN — SODIUM CHLORIDE: 9 INJECTION, SOLUTION INTRAVENOUS at 15:17

## 2020-06-09 RX ADMIN — KETOROLAC TROMETHAMINE 15 MG: 30 INJECTION, SOLUTION INTRAMUSCULAR at 18:08

## 2020-06-09 RX ADMIN — AMPICILLIN SODIUM AND SULBACTAM SODIUM 3 G: 2; 1 INJECTION, POWDER, FOR SOLUTION INTRAMUSCULAR; INTRAVENOUS at 09:25

## 2020-06-09 RX ADMIN — AMPICILLIN SODIUM AND SULBACTAM SODIUM 3 G: 2; 1 INJECTION, POWDER, FOR SOLUTION INTRAMUSCULAR; INTRAVENOUS at 20:57

## 2020-06-09 RX ADMIN — NALOXONE HYDROCHLORIDE 0.2 MG: 0.4 INJECTION, SOLUTION INTRAMUSCULAR; INTRAVENOUS; SUBCUTANEOUS at 14:27

## 2020-06-09 RX ADMIN — AMPICILLIN SODIUM AND SULBACTAM SODIUM 3 G: 2; 1 INJECTION, POWDER, FOR SOLUTION INTRAMUSCULAR; INTRAVENOUS at 01:51

## 2020-06-09 RX ADMIN — HYDROMORPHONE HYDROCHLORIDE 2 MG: 1 INJECTION, SOLUTION INTRAMUSCULAR; INTRAVENOUS; SUBCUTANEOUS at 11:01

## 2020-06-09 RX ADMIN — HYDROMORPHONE HYDROCHLORIDE 2 MG: 1 INJECTION, SOLUTION INTRAMUSCULAR; INTRAVENOUS; SUBCUTANEOUS at 14:16

## 2020-06-09 RX ADMIN — ACETAMINOPHEN ORAL SOLUTION 1000 MG: 650 SOLUTION ORAL at 14:16

## 2020-06-09 ASSESSMENT — PAIN SCALES - GENERAL
PAINLEVEL_OUTOF10: 8
PAINLEVEL_OUTOF10: 5
PAINLEVEL_OUTOF10: 6
PAINLEVEL_OUTOF10: 8
PAINLEVEL_OUTOF10: 4
PAINLEVEL_OUTOF10: 8
PAINLEVEL_OUTOF10: 10

## 2020-06-09 NOTE — PLAN OF CARE
Absence of aspiration  Description: Absence of aspiration  6/9/2020 0037 by Jersey Lai RN  Outcome: Ongoing     Problem: Nutrition Deficit:  Goal: Ability to achieve adequate nutritional intake will improve  Description: Ability to achieve adequate nutritional intake will improve  6/9/2020 0037 by Jersey Lai RN  Outcome: Ongoing     Problem: Bleeding:  Goal: Will show no signs and symptoms of excessive bleeding  Description: Will show no signs and symptoms of excessive bleeding  6/9/2020 0037 by Jersey Lai RN  Outcome: Ongoing  Note: No S/S of excessive bleeding this shift. Will continue to assess. Care plan reviewed with patient. Patient  verbalize understanding of the plan of care and contribute to goal setting.

## 2020-06-10 VITALS
TEMPERATURE: 97.6 F | SYSTOLIC BLOOD PRESSURE: 130 MMHG | RESPIRATION RATE: 16 BRPM | WEIGHT: 262 LBS | BODY MASS INDEX: 32.75 KG/M2 | OXYGEN SATURATION: 98 % | HEART RATE: 82 BPM | DIASTOLIC BLOOD PRESSURE: 84 MMHG

## 2020-06-10 LAB
AEROBIC CULTURE: ABNORMAL
AEROBIC CULTURE: NORMAL
AEROBIC CULTURE: NORMAL
ANAEROBIC CULTURE: ABNORMAL
ANAEROBIC CULTURE: NORMAL
ANAEROBIC CULTURE: NORMAL
GRAM STAIN RESULT: NORMAL
GRAM STAIN RESULT: NORMAL
ORGANISM: ABNORMAL

## 2020-06-10 PROCEDURE — 6360000002 HC RX W HCPCS: Performed by: NURSE PRACTITIONER

## 2020-06-10 PROCEDURE — 99024 POSTOP FOLLOW-UP VISIT: CPT | Performed by: PHYSICIAN ASSISTANT

## 2020-06-10 PROCEDURE — 2580000003 HC RX 258: Performed by: NURSE PRACTITIONER

## 2020-06-10 PROCEDURE — 6370000000 HC RX 637 (ALT 250 FOR IP): Performed by: NURSE PRACTITIONER

## 2020-06-10 RX ORDER — AMOXICILLIN AND CLAVULANATE POTASSIUM 600; 42.9 MG/5ML; MG/5ML
900 POWDER, FOR SUSPENSION ORAL 2 TIMES DAILY
Qty: 105 ML | Refills: 0 | Status: SHIPPED | OUTPATIENT
Start: 2020-06-10 | End: 2020-06-17

## 2020-06-10 RX ORDER — LIDOCAINE HYDROCHLORIDE 20 MG/ML
10 SOLUTION OROPHARYNGEAL EVERY 6 HOURS PRN
Qty: 200 ML | Refills: 0 | Status: SHIPPED | OUTPATIENT
Start: 2020-06-10 | End: 2020-06-15

## 2020-06-10 RX ORDER — KETOROLAC TROMETHAMINE 10 MG/1
10 TABLET, FILM COATED ORAL EVERY 6 HOURS PRN
Qty: 16 TABLET | Refills: 0 | Status: SHIPPED | OUTPATIENT
Start: 2020-06-10

## 2020-06-10 RX ADMIN — AMPICILLIN SODIUM AND SULBACTAM SODIUM 3 G: 2; 1 INJECTION, POWDER, FOR SOLUTION INTRAMUSCULAR; INTRAVENOUS at 09:02

## 2020-06-10 RX ADMIN — KETOROLAC TROMETHAMINE 15 MG: 30 INJECTION, SOLUTION INTRAMUSCULAR at 07:00

## 2020-06-10 RX ADMIN — ACETAMINOPHEN ORAL SOLUTION 1000 MG: 650 SOLUTION ORAL at 13:04

## 2020-06-10 RX ADMIN — AMPICILLIN SODIUM AND SULBACTAM SODIUM 3 G: 2; 1 INJECTION, POWDER, FOR SOLUTION INTRAMUSCULAR; INTRAVENOUS at 03:04

## 2020-06-10 RX ADMIN — KETOROLAC TROMETHAMINE 15 MG: 30 INJECTION, SOLUTION INTRAMUSCULAR at 00:39

## 2020-06-10 ASSESSMENT — PAIN SCALES - GENERAL
PAINLEVEL_OUTOF10: 4
PAINLEVEL_OUTOF10: 0
PAINLEVEL_OUTOF10: 3
PAINLEVEL_OUTOF10: 5
PAINLEVEL_OUTOF10: 1

## 2020-06-10 NOTE — DISCHARGE INSTR - DIET
 Good nutrition is important when healing from an illness, injury, or surgery. Follow any nutrition recommendations given to you during your hospital stay.  If you were given an oral nutrition supplement while in the hospital, continue to take this supplement at home. You can take it with meals, in-between meals, and/or before bedtime. These supplements can be purchased at most local grocery stores, pharmacies, and chain super-stores.  If you have any questions about your diet or nutrition, call the hospital and ask for the dietitian.     Resume usual diets with these exceptions:    Avoid drinking red liquids and avoid sharp/excessively hard foods

## 2020-06-10 NOTE — CARE COORDINATION
6/10/20, 2:36 PM EDT    Patient goals/plan/ treatment preferences discussed by  and . Patient goals/plan/ treatment preferences reviewed with patient/ family. Patient/ family verbalize understanding of discharge plan and are in agreement with goal/plan/treatment preferences. Understanding was demonstrated using the teach back method. AVS provided by RN at time of discharge, which includes all necessary medical information pertaining to the patients current course of illness, treatment, post-discharge goals of care, and treatment preferences.

## 2020-06-10 NOTE — PROGRESS NOTES
CLINICAL PHARMACY: DISCHARGE MED RECONCILIATION/REVIEW    [facility] ([facility]) Select Patient?: No  Total # of Interventions Recommended: 0   -   Total # Interventions Accepted: 0  Intervention Severity:   - Level 1 Intervention Present?: No   - Level 2 #: 0   - Level 3 #: 0   Time Spent (min): 15    Additional Documentation:    Alla Burkitt, PharmD  6/10/2020  3:03 PM
Department of Otolaryngology  Progress Note    Chief Complaint:  Sore throat    SUBJECTIVE:  The patient reports that he feels much better today. He attributes his issues yesterday to not eating. He states his pain is well managed and he has been eating. Current pain is reportedly 4/10. He was able to eat some [employer] toast and sausage this morning. He denies fevers, chills, shortness of breath, dizziness. He was afebrile overnight with O2 saturations at 97% or higher on room air. His voice has improved compared to previous exams. He states he is ready to go home. OBJECTIVE      Physical  VITALS:  /70   Pulse 59   Temp 98.6 °F (37 °C) (Oral)   Resp 16   Wt 262 lb (118.8 kg)   SpO2 98%   BMI 32.75 kg/m²     This is a 26 y.o. male. Patient is alert and oriented to person, place and time. Patient appears well developed, well nourished. Mood is happy with normal affect. Not obviously hearing impaired. Head:   Normocephalic, atraumatic. No obvious masses or lesions noted. Mouth/Throat:  Lips, tongue and oral cavity: Normal. No masses or lesions noted. Normal ROM of jaw. Dentition: good, no malocclusion  Oral mucosa: moist  Tonsils: No signs of active/recent bleeding in bilateral tonsillar fossas. There are intact wound eschars in place over both fossas. Oropharynx: normal-appearing mucosa  Hard and soft palates: symmetrical and intact. Salivary glands: not enlarged and no tenderness to palpation. Uvula: midline, no obvious lesions   Gag reflex is present. Neck: Trachea midline. He reports mild tenderness with palpation of upper neck just inferior to the jaw. No edema noted. Lymphatic: No cervical lymphadenopathy noted. Eyes: ADRY, EOM intact. Conjunctiva moist without discharge. Lungs: Normal effort of breathing, not obviously distressed. Neuro: Cranial nerves II-XII grossly intact. Extremities: No clubbing, edema, or cyanosis noted.     Data  CBC:   Lab Results   Component Value Date
Patient arrived to 4K02 from 6K at this time. Patient responds to voice, oriented x4, following commands. Patient gives very brief answers to questions due to increased pain with speaking. Vitals stable, in NSR on telemetry. Patient denies any needs, states he has updated his family regarding transfer, denies wanting this RN to call family at this time.
without discharge. Lungs: Normal effort of breathing, not obviously distressed. Neuro: Cranial nerves II-XII grossly intact. Extremities: No clubbing, edema, or cyanosis noted. Data  CBC with Differential:    Lab Results   Component Value Date    WBC 20.7 06/08/2020    RBC 4.30 06/08/2020    HGB 13.0 06/08/2020    HCT 39.8 06/08/2020     06/08/2020    MCV 92.6 06/08/2020    MCH 30.2 06/08/2020    MCHC 32.7 06/08/2020    RDW 13.4 01/24/2019    NRBC 0 06/08/2020    SEGSPCT 84.4 06/08/2020    LYMPHOPCT 20 01/24/2019    MONOPCT 7.4 06/08/2020    BASOPCT 1 01/24/2019    MONOSABS 1.5 06/08/2020    LYMPHSABS 1.5 06/08/2020    EOSABS 0.0 06/08/2020    BASOSABS 0.0 06/08/2020    DIFFTYPE NOT REPORTED 01/24/2019     Culture samples were taken of purulent discharge during surgery. Cultures pending, but gram stains available:  Gram Stain Result 06/07/2020  3:08  [facility]   Moderate segmented neutrophils observed. Rare epithelial cells observed. Few gram positive cocci in pairs and chains. Rare gram negative bacilli. Inpatient Medications  Current Facility-Administered Medications: ondansetron (ZOFRAN) injection 4 mg, 4 mg, Intravenous, Q6H PRN  HYDROcodone-acetaminophen 7.5-325 MG per 15ML solution 15 mL, 15 mL, Oral, Q4H PRN  HYDROcodone-acetaminophen 7.5-325 MG per 15ML solution 10 mL, 10 mL, Oral, Q4H PRN  HYDROcodone-acetaminophen 7.5-325 MG per 15ML solution 5 mL, 5 mL, Oral, Q4H PRN  ampicillin-sulbactam (UNASYN) 3 g ivpb minibag, 3 g, Intravenous, Q8H  0.9 % sodium chloride infusion, , Intravenous, Continuous    ASSESSMENT AND PLAN    1. S/P I&D peritonsillar abscess with tonsillectomy  -The patient is doing well. He has been using pain medication, but has only been requesting every 3-4 hours instead of every 2 as ordered. -Transition to PO Hycet for pain management. Discontinuing Dilaudid.   -Continue Unasyn  -Advance diet to pureed foods as tolerated by patient.  If eating warm

## 2020-06-10 NOTE — DISCHARGE SUMMARY
DISCHARGE SUMMARY    Pt Name: Kezia Fan  MRN: [id number]  YOB: 1995  Primary Care Physician: MARK James CNP    Admit date:  6/7/2020 12:43 PM  Discharge date:  No discharge date for patient encounter. Disposition: Home  Admitting Diagnosis:   1. Peritonsillar abscess      Discharge Diagnosis:   Patient Active Problem List   Diagnosis Code    Tongue tie Q38.1    S/P tonsillectomy Z90.89    Peritonsillar abscess J36     Consultants:  none  Procedures/Diagnostic Test:  S/P I&D right peritonsillar abscess with Quinsy tonsillectomy    Hospital Course: Mitcheal Lipoma originally presented to the hospital on 6/7/2020 12:43 PM for evaluation of severe sore throat. He was discovered to have right peritonsillar abscess. He was stable at time of discharge, Ángel Lipoma was tolerating a regular diet, having bowel movements, ambulating on his own accord and had adequate analgesia on Toradol. Patient had no signs or symptoms of complications. PHYSICAL EXAMINATION     Discharge Vitals:  weight is 262 lb (118.8 kg). His oral temperature is 97.6 °F (36.4 °C). His blood pressure is 130/84 and his pulse is 82. His respiration is 16 and oxygen saturation is 98%. General appearance - alert, well appearing, and in no distress  Chest - clear to ausculation  Heart - normal rate and regular rhythm  Incision - healing well, no drainage. Wound eschars in place    Dentition: good, no malocclusion  Oral mucosa: moist  Tonsils: No signs of active/recent bleeding in bilateral tonsillar fossas. There are intact wound eschars in place over both fossas. Oropharynx: normal-appearing mucosa  Hard and soft palates symmetrical and intact. Uvula midline. Gag reflex present    Neck: Trachea midline. Thyroid not enlarged, no palpable masses or tenderness  Lymphatic: No cervical lymphadenopathy noted. Eyes: ADRY, EOM intact. Conjunctiva moist without discharge.   Lungs: Normal effort of breathing, not obviously distressed. LABS     Recent Labs     06/08/20  0422 06/09/20  0649   WBC 20.7* 9.9   HGB 13.0* 13.0*   HCT 39.8* 40.9*    276       DISCHARGE INSTRUCTIONS     Discharge Medications:      Medication List      STOP taking these medications    azithromycin 250 MG tablet  Commonly known as:  Zithromax Z-Manolo        ASK your doctor about these medications    acetaminophen 500 MG tablet  Commonly known as:  APAP Extra Strength  Take 1 tablet by mouth every 6 hours as needed for Pain     azelastine 0.1 % nasal spray  Commonly known as:  ASTELIN  1 spray by Nasal route 2 times daily for 14 days Use in each nostril as directed     Magic Mouthwash  Commonly known as:  Miracle Mouthwash  Swish and spit 15 mLs 4 times daily as needed for Pain Benedryl  12.5mg/5ml     - 30ml  Maalox                                60ml  Viscous Lidocaine              40ml  If equal part premix is the only option it is acceptable     NYQUIL PO     Theraflu Cold & Cough 10-20-20 MG Pack  Generic drug:  Phenylephrine-Pheniramine-DM          Patient will be started on liquid Hycet for oral analgesia. He will have Toradol for breakthrough pain and then transition to the remaining Toradol after Hycet is complete. Once the patient completes Hycet and Toradol he will transition to Tylenol. Diet: diet as tolerated  Activity: activity as tolerated  Wound Care: Daily and as needed  Follow-up:  Follow up with Dr Berry Tavarez in 2 weeks (6/23/20).  Call sooner with new/worsening symptoms  Time Spent for discharge: 40 minutes    Electronically signed by LETICIA Mendieta on 6/10/2020 at 2:33 PM

## 2020-06-10 NOTE — FLOWSHEET NOTE
06/10/20 1245   Encounter Summary   Services provided to: Patient   Referral/Consult From: [facility]   Continue Visiting Yes  (6/10/2020)   Complexity of Encounter Moderate   Length of Encounter 15 minutes   Routine   Type Initial   Assessment Calm; Approachable   Intervention Nurtured hope   Outcome Comfort   Spiritual/Sabianism   Type Spiritual support   [facility] 88 PROGRESS NOTE      Patient: Stone Cain  Room #: 3C-55/170-X            YOB: 1995  Age: 26 y.o. Gender: male            Admit Date & Time: 6/7/2020 12:43 PM    Assessment:  During my contact with the 26  yr old patient, I wanted to evaluate the patients spiritual needs. The pt is presently coping with tonsillectomy. The pt's Code Status is prior. The pt was comfortable sitting in his chair. Interventions:  I offered emotional support and words of encouragement to the pt. T  Outcomes: The pt was appreciative for the spiritual support. The pt is holding on to hope. Plan:   Continued support would be beneficial for the holistic healing of the patient.      Electronically signed by Varun Lozano 6/10/2020 at 1:52 PM.  uJdson Naranjo  [phone]

## 2020-06-16 ENCOUNTER — TELEPHONE (OUTPATIENT)
Dept: ENT CLINIC | Age: 25
End: 2020-06-16

## 2020-06-16 NOTE — TELEPHONE ENCOUNTER
Taking is taking Augmentin and Toradol as prescribed. On a scale of 0 to 10 he would rate his pain as an 8. Is having some difficulty eating and it  sometimes bleeds. The pain is about the same since surgery and he has no fever.

## 2020-06-18 ENCOUNTER — APPOINTMENT (OUTPATIENT)
Dept: CT IMAGING | Age: 25
End: 2020-06-18
Payer: MEDICAID

## 2020-06-18 ENCOUNTER — HOSPITAL ENCOUNTER (EMERGENCY)
Age: 25
Discharge: HOME | End: 2020-06-18
Attending: EMERGENCY MEDICINE
Payer: MEDICAID

## 2020-06-18 ENCOUNTER — TELEPHONE (OUTPATIENT)
Dept: ENT CLINIC | Age: 25
End: 2020-06-18

## 2020-06-18 VITALS
SYSTOLIC BLOOD PRESSURE: 118 MMHG | OXYGEN SATURATION: 100 % | HEART RATE: 66 BPM | TEMPERATURE: 99 F | DIASTOLIC BLOOD PRESSURE: 82 MMHG | RESPIRATION RATE: 18 BRPM

## 2020-06-18 LAB
ALBUMIN SERPL-MCNC: 4 G/DL (ref 3.5–5.1)
ALP BLD-CCNC: 76 U/L (ref 38–126)
ALT SERPL-CCNC: 38 U/L (ref 11–66)
ANION GAP SERPL CALCULATED.3IONS-SCNC: 8 MEQ/L (ref 8–16)
AST SERPL-CCNC: 36 U/L (ref 5–40)
BASOPHILS # BLD: 0.6 %
BASOPHILS ABSOLUTE: 0 THOU/MM3 (ref 0–0.1)
BILIRUB SERPL-MCNC: 0.2 MG/DL (ref 0.3–1.2)
BILIRUBIN DIRECT: < 0.2 MG/DL (ref 0–0.3)
BUN BLDV-MCNC: 13 MG/DL (ref 7–22)
CALCIUM SERPL-MCNC: 9.4 MG/DL (ref 8.5–10.5)
CHLORIDE BLD-SCNC: 104 MEQ/L (ref 98–111)
CO2: 29 MEQ/L (ref 23–33)
CREAT SERPL-MCNC: 1.1 MG/DL (ref 0.4–1.2)
EOSINOPHIL # BLD: 2.6 %
EOSINOPHILS ABSOLUTE: 0.2 THOU/MM3 (ref 0–0.4)
ERYTHROCYTE [DISTWIDTH] IN BLOOD BY AUTOMATED COUNT: 13.2 % (ref 11.5–14.5)
ERYTHROCYTE [DISTWIDTH] IN BLOOD BY AUTOMATED COUNT: 44.8 FL (ref 35–45)
GLUCOSE BLD-MCNC: 90 MG/DL (ref 70–108)
HCT VFR BLD CALC: 41.6 % (ref 42–52)
HEMOGLOBIN: 13.2 GM/DL (ref 14–18)
IMMATURE GRANS (ABS): 0.02 THOU/MM3 (ref 0–0.07)
IMMATURE GRANULOCYTES: 0.2 %
LYMPHOCYTES # BLD: 21.7 %
LYMPHOCYTES ABSOLUTE: 1.8 THOU/MM3 (ref 1–4.8)
MCH RBC QN AUTO: 29.5 PG (ref 26–33)
MCHC RBC AUTO-ENTMCNC: 31.7 GM/DL (ref 32.2–35.5)
MCV RBC AUTO: 92.9 FL (ref 80–94)
MONOCYTES # BLD: 7.4 %
MONOCYTES ABSOLUTE: 0.6 THOU/MM3 (ref 0.4–1.3)
NUCLEATED RED BLOOD CELLS: 0 /100 WBC
OSMOLALITY CALCULATION: 280.9 MOSMOL/KG (ref 275–300)
PLATELET # BLD: 328 THOU/MM3 (ref 130–400)
PMV BLD AUTO: 9.5 FL (ref 9.4–12.4)
POTASSIUM REFLEX MAGNESIUM: 4.4 MEQ/L (ref 3.5–5.2)
RBC # BLD: 4.48 MILL/MM3 (ref 4.7–6.1)
SEG NEUTROPHILS: 67.5 %
SEGMENTED NEUTROPHILS ABSOLUTE COUNT: 5.5 THOU/MM3 (ref 1.8–7.7)
SODIUM BLD-SCNC: 141 MEQ/L (ref 135–145)
TOTAL PROTEIN: 7.4 G/DL (ref 6.1–8)
WBC # BLD: 8.2 THOU/MM3 (ref 4.8–10.8)

## 2020-06-18 PROCEDURE — 6360000002 HC RX W HCPCS: Performed by: EMERGENCY MEDICINE

## 2020-06-18 PROCEDURE — 6360000004 HC RX CONTRAST MEDICATION: Performed by: EMERGENCY MEDICINE

## 2020-06-18 PROCEDURE — 99024 POSTOP FOLLOW-UP VISIT: CPT | Performed by: PHYSICIAN ASSISTANT

## 2020-06-18 PROCEDURE — 80076 HEPATIC FUNCTION PANEL: CPT

## 2020-06-18 PROCEDURE — 70491 CT SOFT TISSUE NECK W/DYE: CPT

## 2020-06-18 PROCEDURE — 99282 EMERGENCY DEPT VISIT SF MDM: CPT

## 2020-06-18 PROCEDURE — 36415 COLL VENOUS BLD VENIPUNCTURE: CPT

## 2020-06-18 PROCEDURE — 96374 THER/PROPH/DIAG INJ IV PUSH: CPT

## 2020-06-18 PROCEDURE — 96375 TX/PRO/DX INJ NEW DRUG ADDON: CPT

## 2020-06-18 PROCEDURE — 2580000003 HC RX 258: Performed by: EMERGENCY MEDICINE

## 2020-06-18 PROCEDURE — 85025 COMPLETE CBC W/AUTO DIFF WBC: CPT

## 2020-06-18 PROCEDURE — 80048 BASIC METABOLIC PNL TOTAL CA: CPT

## 2020-06-18 RX ORDER — HYDROCODONE BITARTRATE AND ACETAMINOPHEN 5; 325 MG/1; MG/1
1 TABLET ORAL EVERY 4 HOURS PRN
Qty: 12 TABLET | Refills: 0 | Status: SHIPPED | OUTPATIENT
Start: 2020-06-18 | End: 2020-06-20

## 2020-06-18 RX ORDER — KETOROLAC TROMETHAMINE 30 MG/ML
15 INJECTION, SOLUTION INTRAMUSCULAR; INTRAVENOUS ONCE
Status: COMPLETED | OUTPATIENT
Start: 2020-06-18 | End: 2020-06-18

## 2020-06-18 RX ORDER — 0.9 % SODIUM CHLORIDE 0.9 %
1000 INTRAVENOUS SOLUTION INTRAVENOUS ONCE
Status: COMPLETED | OUTPATIENT
Start: 2020-06-18 | End: 2020-06-18

## 2020-06-18 RX ORDER — MORPHINE SULFATE 4 MG/ML
4 INJECTION, SOLUTION INTRAMUSCULAR; INTRAVENOUS ONCE
Status: COMPLETED | OUTPATIENT
Start: 2020-06-18 | End: 2020-06-18

## 2020-06-18 RX ADMIN — MORPHINE SULFATE 4 MG: 4 INJECTION, SOLUTION INTRAMUSCULAR; INTRAVENOUS at 12:40

## 2020-06-18 RX ADMIN — IOPAMIDOL 80 ML: 755 INJECTION, SOLUTION INTRAVENOUS at 11:52

## 2020-06-18 RX ADMIN — SODIUM CHLORIDE 1000 ML: 9 INJECTION, SOLUTION INTRAVENOUS at 12:24

## 2020-06-18 RX ADMIN — KETOROLAC TROMETHAMINE 15 MG: 30 INJECTION, SOLUTION INTRAMUSCULAR; INTRAVENOUS at 11:24

## 2020-06-18 ASSESSMENT — PAIN DESCRIPTION - PAIN TYPE: TYPE: ACUTE PAIN

## 2020-06-18 ASSESSMENT — PAIN DESCRIPTION - FREQUENCY: FREQUENCY: INTERMITTENT

## 2020-06-18 ASSESSMENT — ENCOUNTER SYMPTOMS
ABDOMINAL PAIN: 1
SORE THROAT: 1
CHOKING: 0
FACIAL SWELLING: 1
SINUS PAIN: 1
SHORTNESS OF BREATH: 0

## 2020-06-18 ASSESSMENT — PAIN SCALES - GENERAL
PAINLEVEL_OUTOF10: 8
PAINLEVEL_OUTOF10: 5

## 2020-06-18 ASSESSMENT — PAIN DESCRIPTION - LOCATION: LOCATION: THROAT

## 2020-06-18 NOTE — TELEPHONE ENCOUNTER
Patient called and stated that the hydrocodone-acetaminophen is out of stock and would like another pain medication. Please advise.

## 2020-06-18 NOTE — ED PROVIDER NOTES
azelastine (ASTELIN) 0.1 % nasal spray 1 spray by Nasal route 2 times daily for 14 days Use in each nostril as directed, Disp-1 Bottle, R-0Normal             ALLERGIES     Patient has no known allergies. FAMILY HISTORY     No family history on file. SOCIAL HISTORY       Social History     Socioeconomic History    Marital status: Single     Spouse name: Not on file    Number of children: Not on file    Years of education: Not on file    Highest education level: Not on file   Occupational History    Not on file   Social Needs    Financial resource strain: Not on file    Food insecurity     Worry: Not on file     Inability: Not on file    Transportation needs     Medical: Not on file     Non-medical: Not on file   Tobacco Use    Smoking status: Former Smoker     Types: Cigars    Smokeless tobacco: Never Used   Substance and Sexual Activity    Alcohol use: No    Drug use: No    Sexual activity: Yes     Partners: Female   Lifestyle    Physical activity     Days per week: Not on file     Minutes per session: Not on file    Stress: Not on file   Relationships    Social connections     Talks on phone: Not on file     Gets together: Not on file     Attends Scientologist service: Not on file     Active member of club or organization: Not on file     Attends meetings of clubs or organizations: Not on file     Relationship status: Not on file    Intimate partner violence     Fear of current or ex partner: Not on file     Emotionally abused: Not on file     Physically abused: Not on file     Forced sexual activity: Not on file   Other Topics Concern    Not on file   Social History Narrative    Not on file       REVIEW OF SYSTEMS     Review of Systems   Constitutional: Negative for chills and fever. HENT: Positive for sore throat. Except as noted above the remainder of the review of systems was reviewed and is.    PHYSICAL EXAM    (up to 7 for level 4, 8 or more for level 5)     ED Triage Vitals

## 2020-06-18 NOTE — TELEPHONE ENCOUNTER
I called the patient to discuss. He told me he would prefer pill form of pain medications instead of the liquid pain medication. I called [facility] and they informed me they did not have the right dosage of Lortab and contacted Dr. Kendal Salazar and changed the prescription verbally to a different dose since the patient had a written RX for the Lortab. I then verified with the pharmacist that they are cancelling the order for Lortab and that no other narcotics were ordered for the patient before I would order additional narcotics. Rx for Osbaldo Orellana was then sent to pharmacy. I informed the patient and he thanked me. He will contact the office with further questions or concerns.

## 2020-06-18 NOTE — CONSULTS
06/18/2020    CREATININE 1.1 06/18/2020    CALCIUM 9.4 06/18/2020    GFRAA >60 01/24/2019    LABGLOM >90 06/18/2020    GLUCOSE 90 06/18/2020     Radiology Review:  CT Soft tissue neck w contrast  FINDINGS:       There are surgical changes from bilateral tonsillectomies of the palatine tonsils with surgical clips at the surgical beds. No focal fluid collection is identified at the surgical sites. Given this caveat, mucosal surfaces of the aerodigestive tract are    symmetric without focal nodular thickening or visualized mass. There are mildly prominent level 2 lymph nodes, stable compared to prior exam, likely reactive. There is otherwise no cervical lymphadenopathy identified. The parotid, submandibular and    thyroid glands are unremarkable.       Visualized intracranial contents are unremarkable. Visualized orbits are unremarkable. There are small mucous retention cysts in the maxillary sinuses. Mastoid air cells are clear. Visualized portions of the lungs are clear. Visualized osseous structures    appear intact.           Impression       1. Surgical changes from bilateral tonsillectomies without evidence of consultation. 2. Mildly prominent level 2 cervical lymph nodes are likely reactive, stable compared to prior exam.       IMPRESSION/RECOMMENDATIONS:      1. Post- quinsy tonsillectomy hemorrhage with post-op pain  -No signs of active or recent bleeding and none visualized by ER providers. -?? Possible brief bleeding period secondary to right tonsillar eschar shedding  -Discussed with Dr. Darrell Brar and Dr. Morris Armas. The patient has been in the ER for about 4 hours with no further bleeding. Recommended keeping patient for about another hour to ensure no further bleeding  -Recommend discharge with 2 days of Hycet  -I discussed the importance of PO liquid intake with the patient and his pain will get worse if he is dehydrated.  He will contact our office or go back to the ER with further bleeding.  -Patient

## 2020-06-22 ENCOUNTER — OFFICE VISIT (OUTPATIENT)
Dept: ENT CLINIC | Age: 25
End: 2020-06-22

## 2020-06-22 VITALS
HEART RATE: 80 BPM | HEIGHT: 75 IN | WEIGHT: 264 LBS | DIASTOLIC BLOOD PRESSURE: 70 MMHG | SYSTOLIC BLOOD PRESSURE: 122 MMHG | TEMPERATURE: 97 F | BODY MASS INDEX: 32.83 KG/M2 | RESPIRATION RATE: 14 BRPM

## 2020-06-22 LAB — GFR SERPL CREATININE-BSD FRML MDRD: 82 ML/MIN/1.73M2

## 2020-06-22 PROCEDURE — 1111F DSCHRG MED/CURRENT MED MERGE: CPT | Performed by: OTOLARYNGOLOGY

## 2020-06-22 PROCEDURE — 1036F TOBACCO NON-USER: CPT | Performed by: OTOLARYNGOLOGY

## 2020-06-22 PROCEDURE — 99024 POSTOP FOLLOW-UP VISIT: CPT | Performed by: OTOLARYNGOLOGY

## 2020-06-22 PROCEDURE — G8417 CALC BMI ABV UP PARAM F/U: HCPCS | Performed by: OTOLARYNGOLOGY

## 2020-06-22 PROCEDURE — G8427 DOCREV CUR MEDS BY ELIG CLIN: HCPCS | Performed by: OTOLARYNGOLOGY

## 2020-06-22 RX ORDER — HYDROCODONE BITARTRATE AND ACETAMINOPHEN 5; 325 MG/1; MG/1
1 TABLET ORAL EVERY 6 HOURS PRN
COMMUNITY

## 2020-06-22 NOTE — PROGRESS NOTES
[facility], NOSE [facility] TH[facility]  Elvia Grewal [address] [place] [address]  Dept: [phone]  Dept Fax: [phone]  Loc: [phone]    Cadence Interiano is a 26 y.o. male who was referred by No ref. provider found for:  Chief Complaint   Patient presents with    Post-Op Check     Patient is here post-op tonsillectomy/Abscess. c/o pain in the morning and night around a 7 on a scale 1-10       HPI:     Cadence Interiano is a 26 y.o. male is almost 2 weeks status post a quinsy tonsillectomy with bilateral intra-and peritonsillar abscesses. He was seen and admitted for observation approximately 3 days ago with report of some bright red blood earlier that morning. He had no further bleeding so he was discharged to home. He is here today describing pain associated with eating that he describes as a 7 out of 10 but when he is not eating or drinking his pain is really very modest.  He is sleeping adequately well. History:     No Known Allergies  Current Outpatient Medications   Medication Sig Dispense Refill    HYDROcodone-acetaminophen (NORCO) 5-325 MG per tablet Take 1 tablet by mouth every 6 hours as needed for Pain.  ketorolac (TORADOL) 10 MG tablet Take 1 tablet by mouth every 6 hours as needed for Pain You may use this for break through pain if Hycet wears off before next dose. After out of Hycet use the remainder of this prescription. Then transition to Tylenol. Do not use this with ibuprofen 16 tablet 0    acetaminophen (APAP EXTRA STRENGTH) 500 MG tablet Take 1 tablet by mouth every 6 hours as needed for Pain 30 tablet 0     No current facility-administered medications for this visit.       Past Medical History:   Diagnosis Date    Asthma       Past Surgical History:   Procedure Laterality Date    INCISION AND DRAINAGE OF TONSILLAR ABSCESS N/A 6/7/2020    TONSIL INCISION AND DRAINAGE, with TONSILECTOMY performed by Marbin Sahu MD at [address] [place] [address] TONSILLECTOMY  06/07/2020     History reviewed. No pertinent family history. Social History     Tobacco Use    Smoking status: Former Smoker     Types: Cigars    Smokeless tobacco: Never Used   Substance Use Topics    Alcohol use: No        Subjective:      Review of Systems  Rest of review of systems are negative, except as noted in HPI. Objective:     /70 (Site: Left Upper Arm, Position: Sitting)   Pulse 80   Temp 97 °F (36.1 °C) (Infrared)   Resp 14   Ht 6' 3\" (1.905 m)   Wt 264 lb (119.7 kg)   BMI 33.00 kg/m²     Physical Exam     On general physical exam the patient is a pleasant well-appearing tall somewhat overweight young adult male who appears his stated age. His speech pattern is within normal limits as is his voice. He is breathing without labor. His oropharynx is abnormal for the presence of well-healing tonsillar fossa wounds without any signs of recent or active hemorrhage. He has no velopharyngeal insufficiency or hypernasality to his voice. His speech pattern is normal.    Vitals reviewed. Ct Soft Tissue Neck W Contrast    Result Date: 6/18/2020   1. Surgical changes from bilateral tonsillectomies without evidence of consultation. 2. Mildly prominent level 2 cervical lymph nodes are likely reactive, stable compared to prior exam. **This report has been created using voice recognition software. It may contain minor errors which are inherent in voice recognition technology. ** Final report electronically signed by Dr. Tabitha Chaves MD on 6/18/2020 12:28 PM     Lab Results   Component Value Date     06/18/2020     01/24/2019     12/19/2014    K 4.4 06/18/2020    K 4.2 01/24/2019    K 3.8 12/19/2014     06/18/2020     01/24/2019    CL 99 12/19/2014    CO2 29 06/18/2020    CO2 25 01/24/2019    CO2 23 12/19/2014    BUN 13 06/18/2020    BUN 10 01/24/2019    BUN 15 12/19/2014    CREATININE 1.1 06/18/2020    CREATININE 0.99 01/24/2019 CREATININE 1.1 12/19/2014    CALCIUM 9.4 06/18/2020    CALCIUM 9.4 01/24/2019    CALCIUM 9.8 12/19/2014    PROT 7.4 06/18/2020    PROT 7.6 01/24/2019    LABALBU 4.0 06/18/2020    LABALBU 4.7 01/24/2019    BILITOT 0.2 06/18/2020    BILITOT 0.51 01/24/2019    ALKPHOS 76 06/18/2020    ALKPHOS 82 01/24/2019    AST 36 06/18/2020    AST 18 01/24/2019    ALT 38 06/18/2020    ALT 27 01/24/2019       All of the past medical history, past surgical history, family history,social history, allergies and current medications were reviewed with the patient. Assessment & Plan   Diagnoses and all orders for this visit:     Diagnosis Orders   1. Peritonsillar abscess     2. S/P tonsillectomy     3. Post-tonsillectomy hemorrhage         Based on his history and these physical findings, I recommended that we proceed with topical treatment of his tonsillar fossa tenderness rather than a new prescription for narcotics. He will get what is called \"magic mouthwash\" and gargle it for several minutes a few minutes prior to each meal.  He is to spit the liquid out and not to swallow it. This was carefully explained to the patient. I will see him back in approximately 2 months to make sure he is healing well. He and his wife reported understanding the basis of my recommendations and being willing to proceed as such. Return in about 9 weeks (around 8/24/2020). **This report has been created using voice recognition software. It may contain minor errors which are inherent in voice recognition technology. **

## 2022-03-16 ENCOUNTER — HOSPITAL ENCOUNTER (EMERGENCY)
Age: 27
Discharge: HOME | End: 2022-03-16
Payer: COMMERCIAL

## 2022-03-16 VITALS
RESPIRATION RATE: 18 BRPM | DIASTOLIC BLOOD PRESSURE: 96 MMHG | SYSTOLIC BLOOD PRESSURE: 159 MMHG | TEMPERATURE: 98.1 F | OXYGEN SATURATION: 99 % | HEART RATE: 88 BPM

## 2022-03-16 DIAGNOSIS — A08.4 VIRAL GASTROENTERITIS: Primary | ICD-10-CM

## 2022-03-16 LAB
FLU A ANTIGEN: NEGATIVE
FLU B ANTIGEN: NEGATIVE
SARS-COV-2, NAAT: NOT DETECTED

## 2022-03-16 PROCEDURE — 6370000000 HC RX 637 (ALT 250 FOR IP): Performed by: PHYSICIAN ASSISTANT

## 2022-03-16 PROCEDURE — 99282 EMERGENCY DEPT VISIT SF MDM: CPT

## 2022-03-16 PROCEDURE — 87635 SARS-COV-2 COVID-19 AMP PRB: CPT

## 2022-03-16 PROCEDURE — 87804 INFLUENZA ASSAY W/OPTIC: CPT

## 2022-03-16 RX ORDER — ONDANSETRON 4 MG/1
4 TABLET, ORALLY DISINTEGRATING ORAL ONCE
Status: COMPLETED | OUTPATIENT
Start: 2022-03-16 | End: 2022-03-16

## 2022-03-16 RX ORDER — ONDANSETRON 4 MG/1
4 TABLET, ORALLY DISINTEGRATING ORAL EVERY 8 HOURS PRN
Qty: 6 TABLET | Refills: 0 | Status: SHIPPED | OUTPATIENT
Start: 2022-03-16 | End: 2022-03-19

## 2022-03-16 RX ADMIN — ONDANSETRON 4 MG: 4 TABLET, ORALLY DISINTEGRATING ORAL at 12:37

## 2022-03-16 ASSESSMENT — PAIN DESCRIPTION - ORIENTATION: ORIENTATION: LOWER;MID

## 2022-03-16 ASSESSMENT — PAIN SCALES - GENERAL: PAINLEVEL_OUTOF10: 6

## 2022-03-16 ASSESSMENT — PAIN DESCRIPTION - PAIN TYPE: TYPE: ACUTE PAIN

## 2022-03-16 ASSESSMENT — PAIN DESCRIPTION - LOCATION: LOCATION: BACK

## 2022-03-16 NOTE — ED NOTES
Pt in through lobby. He states his kids were sick with emesis and diarrhea the past 2 days. He states last night he began having the same symptoms.       Rosita Flannery RN  03/16/22 2280

## 2022-03-20 ASSESSMENT — ENCOUNTER SYMPTOMS
SINUS PAIN: 0
NAUSEA: 1
WHEEZING: 0
RHINORRHEA: 1
SORE THROAT: 0
SHORTNESS OF BREATH: 0
DIARRHEA: 1
SINUS PRESSURE: 0
VOMITING: 1
COUGH: 1
ABDOMINAL PAIN: 0

## 2022-03-20 NOTE — ED PROVIDER NOTES
[address] [facility] [address] [address] EMERGENCY DEPT    EMERGENCY MEDICINE     Pt Name: Yolette Srinivasan  MRN: [id number]  [place] 1995  Date of evaluation: 3/16/2022  Provider: Tye Ring PA-C    CHIEF COMPLAINT       Chief Complaint   Patient presents with    Emesis    Diarrhea       HISTORY OF PRESENT ILLNESS    Yolette Srinivasan is a pleasant 23 y.o. male who presents to the emergency department for vomiting, cough, congestion. Patient states that last night around midnight he started developing mild cough, congestion, vomiting, diarrhea, body aches. He states he has 3 children at home who had similar symptoms as he knows now that started 2 days ago in which they were all doing better now. He is able to tolerate food and fluids. He has no complaints of fever, shortness of breath, chest pain, headache, back pain, abdominal pain. He is currently mildly nauseous. No other concerns or complaints at this time. Triage notes and Nursing notes were reviewed by myself. Any discrepancies are addressed above. PAST MEDICAL HISTORY     Past Medical History:   Diagnosis Date    Asthma        SURGICAL HISTORY       Past Surgical History:   Procedure Laterality Date    INCISION AND DRAINAGE OF TONSILLAR ABSCESS N/A 6/7/2020    TONSIL INCISION AND DRAINAGE, with TONSILECTOMY performed by Gildardo Hill MD at [address] [place] [address]  06/07/2020       CURRENT MEDICATIONS       Discharge Medication List as of 3/16/2022 12:38 PM      CONTINUE these medications which have NOT CHANGED    Details   HYDROcodone-acetaminophen (NORCO) 5-325 MG per tablet Take 1 tablet by mouth every 6 hours as needed for Pain. Historical Med      ketorolac (TORADOL) 10 MG tablet Take 1 tablet by mouth every 6 hours as needed for Pain You may use this for break through pain if Hycet wears off before next dose. After out of Hycet use the remainder of this prescription. Then transition to Tylenol.  Do not use this with ibuprofen, Di sp-16 tablet, R-0Normal      acetaminophen (APAP EXTRA STRENGTH) 500 MG tablet Take 1 tablet by mouth every 6 hours as needed for Pain, Disp-30 tablet, R-0Normal             ALLERGIES     Patient has no known allergies. FAMILY HISTORY     No family history on file. SOCIAL HISTORY       Social History     Socioeconomic History    Marital status: Single     Spouse name: Not on file    Number of children: Not on file    Years of education: Not on file    Highest education level: Not on file   Occupational History    Not on file   Tobacco Use    Smoking status: Former Smoker     Types: Cigars    Smokeless tobacco: Never Used   Vaping Use    Vaping Use: Never used   Substance and Sexual Activity    Alcohol use: No    Drug use: No    Sexual activity: Yes     Partners: Female   Other Topics Concern    Not on file   Social History Narrative    Not on file     Social Determinants of Health     Financial Resource Strain:     Difficulty of Paying Living Expenses: Not on file   Food Insecurity:     Worried About Running Out of Food in the Last Year: Not on file    Walter of Food in the Last Year: Not on file   Transportation Needs:     Lack of Transportation (Medical): Not on file    Lack of Transportation (Non-Medical):  Not on file   Physical Activity:     Days of Exercise per Week: Not on file    Minutes of Exercise per Session: Not on file   Stress:     Feeling of Stress : Not on file   Social Connections:     Frequency of Communication with Friends and Family: Not on file    Frequency of Social Gatherings with Friends and Family: Not on file    Attends Hinduism Services: Not on file    Active Member of Clubs or Organizations: Not on file    Attends Club or Organization Meetings: Not on file    Marital Status: Not on file   Intimate Partner Violence:     Fear of Current or Ex-Partner: Not on file    Emotionally Abused: Not on file    Physically Abused: Not on file    Sexually Abused: Not on file Housing Stability:     Unable to Pay for Housing in the Last Year: Not on file    Number of Places Lived in the Last Year: Not on file    Unstable Housing in the Last Year: Not on file       REVIEW OF SYSTEMS     Review of Systems   Constitutional: Negative for activity change, appetite change, chills and fever. HENT: Positive for congestion and rhinorrhea. Negative for ear pain, sinus pressure, sinus pain and sore throat. Respiratory: Positive for cough. Negative for shortness of breath and wheezing. Cardiovascular: Negative for chest pain and palpitations. Gastrointestinal: Positive for diarrhea, nausea and vomiting. Negative for abdominal pain. Genitourinary: Negative for dysuria and urgency. Musculoskeletal: Positive for myalgias. Negative for arthralgias and neck pain. Skin: Negative for rash. Neurological: Negative for dizziness and headaches. All other systems reviewed and are negative. Except as noted above the remainder of the review of systems was reviewed and is. PHYSICAL EXAM     INITIAL VITALS:  oral temperature is 98.1 °F (36.7 °C). His blood pressure is 159/96 (abnormal) and his pulse is 88. His respiration is 18 and oxygen saturation is 99%. Physical Exam  Vitals and nursing note reviewed. Exam conducted with a chaperone present. Constitutional:       General: He is not in acute distress. Appearance: Normal appearance. He is normal weight. He is not ill-appearing, toxic-appearing or diaphoretic. HENT:      Head: Normocephalic and atraumatic. Right Ear: Tympanic membrane, ear canal and external ear normal. There is no impacted cerumen. Left Ear: Tympanic membrane, ear canal and external ear normal. There is no impacted cerumen. Nose: Congestion and rhinorrhea present. Mouth/Throat:      Mouth: Mucous membranes are moist.      Pharynx: Oropharynx is clear. No oropharyngeal exudate or posterior oropharyngeal erythema.    Eyes: Extraocular Movements: Extraocular movements intact. Conjunctiva/sclera: Conjunctivae normal.      Pupils: Pupils are equal, round, and reactive to light. Cardiovascular:      Rate and Rhythm: Normal rate and regular rhythm. Pulses: Normal pulses. Heart sounds: Normal heart sounds. No murmur heard. Pulmonary:      Effort: Pulmonary effort is normal. No respiratory distress. Breath sounds: Normal breath sounds. No wheezing, rhonchi or rales. Abdominal:      General: Bowel sounds are normal. There is no distension. Palpations: Abdomen is soft. Tenderness: There is abdominal tenderness in the epigastric area. There is no right CVA tenderness, left CVA tenderness, guarding or rebound. Comments: Mild tenderness palpation epigastric region   Musculoskeletal:         General: Normal range of motion. Cervical back: Normal range of motion and neck supple. Lymphadenopathy:      Cervical: No cervical adenopathy. Skin:     General: Skin is warm and dry. Capillary Refill: Capillary refill takes less than 2 seconds. Neurological:      Mental Status: He is alert and oriented to person, place, and time. GCS: GCS eye subscore is 4. GCS verbal subscore is 5. GCS motor subscore is 6. Psychiatric:         Mood and Affect: Mood normal.         Behavior: Behavior normal.         Thought Content:  Thought content normal.         DIFFERENTIAL DIAGNOSIS:   Differential diagnoses are discussed    DIAGNOSTIC RESULTS     EKG: All EKG's are interpreted by the Emergency Department Physician who either signs or Co-signsthis chart in the absence of a cardiologist.    None      RADIOLOGY: non-plain film images(s) such as CT, Ultrasound and MRI are read by the radiologist.    No orders to display       LABS:   Labs Reviewed   RAPID INFLUENZA A/B ANTIGENS   COVID-19, RAPID       EMERGENCY DEPARTMENT COURSE:   Vitals:    Vitals:    03/16/22 1154   BP: (!) 159/96   Pulse: 88   Resp: 18 Temp: 98.1 °F (36.7 °C)   TempSrc: Oral   SpO2: 99%     7:18 AM EDT: The patient was seen and evaluated. MDM:  Patient is 41-year-old male who came to the ED to be evaluated for vomiting, diarrhea, cough, congestion. Lyn Lyons Appropriate testing/imaging of rapid influenza and COVID was done based on the patient's initial complaints, history, and physical exam.   Results of rapid influenza and COVID demonstrated no emergent findings. Pertinent results were none. Patient reviewed results as of. Discussed at this point most likely viral in nature and recommend symptomatic care. Will prescribe Zofran for nausea control. Recommend plenty of clear fluid oral hydration, rest, good nutrition. Patient was seen independently by myself. The patient's final impression and disposition and plan was determined by myself. Strict return precautions and follow up instructions were discussed with the patient prior to discharge, with which the patient agrees. Physical assessment findings, diagnostic testing(s) if applicable, and vital signs reviewed with patient/patient representative. Questions answered. Medications as directed, including OTC medications for supportive care. Education provided on medications. Differential diagnosis(s) discussed with patient/patient representative. Home care/self care instructions reviewed with patient/patient representative. Patient is to follow-up with family care provider in 2-3 days if no improvement. Patient is to go to the emergency department if symptoms worsen. Patient/patient representative is aware of care plan, questions answered, verbalizes understanding and is in agreement. CRITICAL CARE:   None    CONSULTS:  None    PROCEDURES:  None    FINAL IMPRESSION      1.  Viral gastroenteritis          DISPOSITION/PLAN   Discharge    PATIENT REFERRED [facility]:  [facility]  [phone]    In 2 days  If not improving    [facility] EMERGENCY DEPT  [address]  [phone]  In 2 days  If symptoms worsen      DISCHARGEMEDICATIONS:  Discharge Medication List as of 3/16/2022 12:38 PM      START taking these medications    Details   ondansetron (ZOFRAN ODT) 4 MG disintegrating tablet Take 1 tablet by mouth every 8 hours as needed for Nausea, Disp-6 tablet, R-0Normal                 (Please note that portions of this note were completed with a voice recognition program.  Efforts were made to edit the dictations but occasionallywords are mis-transcribed.)      Lino Alexandra PA-C(electronically signed)  Physician Associate, Emergency Department       Lino Alexandra PA-C  03/20/22 4544

## 2022-12-25 ENCOUNTER — HOSPITAL ENCOUNTER (EMERGENCY)
Age: 27
Discharge: HOME | End: 2022-12-25
Attending: EMERGENCY MEDICINE
Payer: COMMERCIAL

## 2022-12-25 VITALS
SYSTOLIC BLOOD PRESSURE: 133 MMHG | BODY MASS INDEX: 30.44 KG/M2 | TEMPERATURE: 98.1 F | DIASTOLIC BLOOD PRESSURE: 77 MMHG | HEIGHT: 76 IN | OXYGEN SATURATION: 99 % | HEART RATE: 88 BPM | WEIGHT: 250 LBS | RESPIRATION RATE: 18 BRPM

## 2022-12-25 DIAGNOSIS — L03.011 PARONYCHIA OF FINGER OF RIGHT HAND: Primary | ICD-10-CM

## 2022-12-25 PROCEDURE — 6370000000 HC RX 637 (ALT 250 FOR IP): Performed by: EMERGENCY MEDICINE

## 2022-12-25 PROCEDURE — 10061 I&D ABSCESS COMP/MULTIPLE: CPT

## 2022-12-25 PROCEDURE — 99283 EMERGENCY DEPT VISIT LOW MDM: CPT

## 2022-12-25 RX ORDER — DOXYCYCLINE 100 MG/1
100 TABLET ORAL 2 TIMES DAILY
Qty: 20 TABLET | Refills: 0 | Status: SHIPPED | OUTPATIENT
Start: 2022-12-25 | End: 2022-12-25 | Stop reason: SDUPTHER

## 2022-12-25 RX ORDER — DOXYCYCLINE 100 MG/1
100 TABLET ORAL 2 TIMES DAILY
Qty: 20 TABLET | Refills: 0 | Status: SHIPPED | OUTPATIENT
Start: 2022-12-25 | End: 2023-01-04

## 2022-12-25 RX ORDER — ACETAMINOPHEN 500 MG
1000 TABLET ORAL ONCE
Status: COMPLETED | OUTPATIENT
Start: 2022-12-25 | End: 2022-12-25

## 2022-12-25 RX ORDER — DOXYCYCLINE HYCLATE 100 MG
100 TABLET ORAL ONCE
Status: COMPLETED | OUTPATIENT
Start: 2022-12-25 | End: 2022-12-25

## 2022-12-25 RX ORDER — IBUPROFEN 200 MG
400 TABLET ORAL ONCE
Status: COMPLETED | OUTPATIENT
Start: 2022-12-25 | End: 2022-12-25

## 2022-12-25 RX ADMIN — ACETAMINOPHEN 1000 MG: 500 TABLET ORAL at 16:58

## 2022-12-25 RX ADMIN — DOXYCYCLINE HYCLATE 100 MG: 100 TABLET, COATED ORAL at 16:58

## 2022-12-25 RX ADMIN — IBUPROFEN 400 MG: 200 TABLET, FILM COATED ORAL at 16:58

## 2022-12-25 ASSESSMENT — PAIN DESCRIPTION - ORIENTATION: ORIENTATION: RIGHT

## 2022-12-25 ASSESSMENT — PAIN SCALES - GENERAL: PAINLEVEL_OUTOF10: 6

## 2022-12-25 ASSESSMENT — PAIN DESCRIPTION - DESCRIPTORS: DESCRIPTORS: THROBBING

## 2022-12-25 ASSESSMENT — PAIN - FUNCTIONAL ASSESSMENT: PAIN_FUNCTIONAL_ASSESSMENT: 0-10

## 2022-12-25 ASSESSMENT — PAIN DESCRIPTION - LOCATION: LOCATION: FINGER (COMMENT WHICH ONE)

## 2022-12-25 NOTE — ED TRIAGE NOTES
Pt c/o pain and swelling to right middle finger. Pt reports sx began about 3 days ago. Pt denies fever or chills. Pt reports taking Tylenol and Motrin PTA without relief.

## 2022-12-25 NOTE — DISCHARGE INSTRUCTIONS
Return to the Emergency Department immediately if you develop worsenign pain, swelling, or worsening , or you have any other concerns. Please follow up with your [facility] doctor in 2-3 days.   Use warm soaks

## 2022-12-26 NOTE — ED PROVIDER NOTES
[address] [facility] [address] [address] EMERGENCY DEPT      EMERGENCY MEDICINE     Pt Name: Lefty Mccain  MRN: [id number]  [place] 1995  Date of evaluation: 2022  Provider: Jose A Scott [address] [place] [address]       Chief Complaint   Patient presents with    Finger Pain     HISTORY OF PRESENT ILLNESS   Lefty Mccain is a pleasant 33 y.o. male who presents to the emergency department from home   Here for pain and swelling of dorsum of R middle finger  No fever    PASTMEDICAL HISTORY     Past Medical History:   Diagnosis Date    Asthma        Patient Active Problem List   Diagnosis Code    Tongue tie Q38.1    S/P tonsillectomy Z90.89    Peritonsillar abscess J36    Post-tonsillectomy hemorrhage J95.830     SURGICAL HISTORY       Past Surgical History:   Procedure Laterality Date    INCISION AND DRAINAGE OF TONSILLAR ABSCESS N/A 2020    TONSIL INCISION AND DRAINAGE, with TONSILECTOMY performed by Alek Lee MD at [address] [place] [address]  2020       CURRENT MEDICATIONS       Discharge Medication List as of 2022  4:58 PM        CONTINUE these medications which have NOT CHANGED    Details   HYDROcodone-acetaminophen (NORCO) 5-325 MG per tablet Take 1 tablet by mouth every 6 hours as needed for Pain. Historical Med      ketorolac (TORADOL) 10 MG tablet Take 1 tablet by mouth every 6 hours as needed for Pain You may use this for break through pain if Hycet wears off before next dose. After out of Hycet use the remainder of this prescription. Then transition to Tylenol. Do not use this with ibuprofen, Di sp-16 tablet, R-0Normal      acetaminophen (APAP EXTRA STRENGTH) 500 MG tablet Take 1 tablet by mouth every 6 hours as needed for Pain, Disp-30 tablet, R-0Normal             ALLERGIES     has No Known Allergies. FAMILY HISTORY     He indicated that his mother is alive. He indicated that his father is .        SOCIAL HISTORY       Social History     Tobacco Use    Smoking status: Former     Types: Cigars    Smokeless tobacco: Never   Vaping Use    Vaping Use: Never used   Substance Use Topics    Alcohol use: No    Drug use: Yes     Types: Marijuana (Weed)       PHYSICAL EXAM       ED Triage Vitals [12/25/22 1537]   BP Temp Temp Source Heart Rate Resp SpO2 Height Weight   133/77 98.1 °F (36.7 °C) Oral 88 18 99 % 6' 4\" (1.93 m) 250 lb (113.4 kg)       Physical Exam  Vitals and nursing note reviewed. Constitutional:       Appearance: He is well-developed. HENT:      Head: Normocephalic. Eyes:      Extraocular Movements: Extraocular movements intact. Conjunctiva/sclera: Conjunctivae normal.      Pupils: Pupils are equal, round, and reactive to light. Neck:      Trachea: No tracheal deviation. Pulmonary:      Effort: Pulmonary effort is normal.   Musculoskeletal:         General: Normal range of motion. Cervical back: Neck supple. Comments: Good AROM of all joints of R hand/fingers  R middle finger there is paronychia with visible pus at the eponychial fold and proximally trackign into lateral paronychia  Digit from DIP distally is swollen, tender  Distal senstaion is intact  Pad of middle finger is soft though tender   Skin:     General: Skin is warm and dry. Neurological:      Mental Status: He is alert and oriented to person, place, and time. Cranial Nerves: No cranial nerve deficit. FORMAL DIAGNOSTIC RESULTS     RADIOLOGY: Interpretation per the Radiologist below, if available at the time of this note (none if blank):     No orders to display       LABS: (none if blank)  Labs Reviewed - No data to display    (Any cultures that may have been sent were not resulted at the time of this patient visit)    [address] / ED COURSE:     1) Number and Complexity of Problems            Problem List This Visit:         Chief Complaint   Patient presents with    Finger Pain   And eponychial/paronychial swelling        Differential Diagnosis includes (but not limited to): Paronychia, cellulitis, FB, fellon                 Pertinent Comorbid Conditions:    none    2)  Data Reviewed (none if left blank)          My Independent interpretations:     EKG:          Imaging:     Labs:                       Decision Rules/Clinical Scores utilized:              External Documentation Reviewed:         none             See Formal Diagnostic Results above for the lab and radiology tests and orders. 3)  Treatment and Disposition           Shared Decision-Making was performed and disposition discussed with the        Patient/Family and questions answered risks vs benefits of I&D and Abx vs abx alone w/ warm soaks        Summary of Patient Presentation:      MDM  Number of Diagnoses or Management Options  /   Vitals Reviewed:    Vitals:    12/25/22 1537   BP: 133/77   Pulse: 88   Resp: 18   Temp: 98.1 °F (36.7 °C)   TempSrc: Oral   SpO2: 99%   Weight: 250 lb (113.4 kg)   Height: 6' 4\" (1.93 m)       The patient was seen and examined. Appropriate diagnostic testing was performed and results reviewed with the patient. The results of pertinent diagnostic studies and exam findings were discussed. The patients provisional diagnosis and plan of care were discussed with the patient and present family who expressed understanding. Any medications were reviewed and indications and risks of medications were discussed with the patient /family present. Strict verbal and written return precautions, instructions and appropriate follow-up provided to  the patient ([facility] hand surgery follow up in the next 2 days).      ED Medications administered this visit:  (None if blank)  Medications   ibuprofen (ADVIL;MOTRIN) tablet 400 mg (400 mg Oral Given 12/25/22 1658)   acetaminophen (TYLENOL) tablet 1,000 mg (1,000 mg Oral Given 12/25/22 1658)   doxycycline hyclate (VIBRA-TABS) tablet 100 mg (100 mg Oral Given 12/25/22 1658)         PROCEDURES: (None if blank)  Incision/Drainage    Date/Time: 12/25/2022 10:45 PM  Performed by: Areli Ruiz DO  Authorized by: Areli Ruiz DO     Consent:     Consent obtained:  Verbal    Consent given by:  Patient    Alternatives discussed:  No treatment  Universal protocol:     Procedure explained and questions answered to patient or proxy's satisfaction: yes      Site/side marked: yes      Patient identity confirmed:  Verbally with patient  Location:     Indications for incision and drainage: paronychia. Size:  R middle finger    Location:  Upper extremity  Pre-procedure details:     Skin preparation:  Povidone-iodine  Sedation:     Sedation type:  None  Anesthesia:     Anesthesia method:  Nerve block    Block location:  Middle finger digital block    Block needle gauge:  27 G    Block anesthetic:  Lidocaine 1% w/o epi    Block injection procedure:  Anatomic landmarks identified, introduced needle, incremental injection, anatomic landmarks palpated and negative aspiration for blood    Block outcome:  Anesthesia achieved  Procedure type:     Complexity:  Simple  Procedure details:     Ultrasound guidance: no      Needle aspiration: no      Incision types:  Stab incision    Incision depth:  Dermal    Wound management:  Probed and deloculated    Drainage:  Purulent    Drainage amount: Moderate    Wound treatment:  Wound left open    Packing materials:  None  Post-procedure details:     Procedure completion:  Tolerated well, no immediate complications:     CRITICAL CARE: (None if blank)          ADDENDUM:  Called pt re: tetanus status--it is not up to date and he requires return for tetanus update      DISCHARGE PRESCRIPTIONS: (None if blank)  Discharge Medication List as of 12/25/2022  4:58 PM          FINAL IMPRESSION      1.  Paronychia of finger of right hand          DISPOSITION/PLAN   DISPOSITION Decision To Discharge 12/25/2022 05:05:16 PM      OUTPATIENT FOLLOW UP THE PATIENT:  [address] [phone]  In 1 week        DO Geoffrey Garcia,   12/25/22 5562

## 2022-12-28 ENCOUNTER — HOSPITAL ENCOUNTER (EMERGENCY)
Age: 27
Discharge: HOME | End: 2022-12-28
Payer: COMMERCIAL

## 2022-12-28 VITALS
DIASTOLIC BLOOD PRESSURE: 96 MMHG | HEART RATE: 85 BPM | RESPIRATION RATE: 18 BRPM | HEIGHT: 76 IN | SYSTOLIC BLOOD PRESSURE: 123 MMHG | WEIGHT: 259 LBS | TEMPERATURE: 98.2 F | OXYGEN SATURATION: 100 % | BODY MASS INDEX: 31.54 KG/M2

## 2022-12-28 DIAGNOSIS — L03.011 PARONYCHIA OF FINGER OF RIGHT HAND: Primary | ICD-10-CM

## 2022-12-28 PROCEDURE — 99283 EMERGENCY DEPT VISIT LOW MDM: CPT

## 2022-12-28 RX ORDER — NAPROXEN 500 MG/1
500 TABLET ORAL 2 TIMES DAILY
Qty: 20 TABLET | Refills: 0 | Status: SHIPPED | OUTPATIENT
Start: 2022-12-28

## 2022-12-28 RX ORDER — CLINDAMYCIN HYDROCHLORIDE 150 MG/1
300 CAPSULE ORAL 4 TIMES DAILY
Qty: 80 CAPSULE | Refills: 0 | Status: SHIPPED | OUTPATIENT
Start: 2022-12-28 | End: 2023-01-07

## 2022-12-28 NOTE — Clinical Note
Luigi Hickey was seen and treated in our emergency department on 12/28/2022. He may return to work on 12/31/2022. If you have any questions or concerns, please don't hesitate to call.       LETICIA Renteria

## 2022-12-28 NOTE — ED PROVIDER NOTES
Reason for Visit: Finger Pain      HISTORY OF PRESENT ILLNESS       HPI: This 33 y.o. malepresents to the emergency department for swelling and pain to right middle finger ongoing for 8 days. Patient was seen approximately 4 days ago and had an I&D of a paronychia and placed on antibiotics with some relief however patient states that seems to be worsening at this time. Aching in nature constant worse with touch not makes better moderate severity. No numbness tingling right hand. No fevers or chills. Patient right-hand-dominant. No other complaints.     Past Medical History:   Diagnosis Date    Asthma        Past Surgical History:   Procedure Laterality Date    INCISION AND DRAINAGE OF TONSILLAR ABSCESS N/A 6/7/2020    TONSIL INCISION AND DRAINAGE, with TONSILECTOMY performed by Chris Macias MD at [place] [address]  06/07/2020       [address] History     Socioeconomic History    Marital status: Single     Spouse name: Not on file    Number of children: Not on file    Years of education: Not on file    Highest education level: Not on file   Occupational History    Not on file   Tobacco Use    Smoking status: Former     Types: Cigars    Smokeless tobacco: Never   Vaping Use    Vaping Use: Never used   Substance and Sexual Activity    Alcohol use: No    Drug use: Yes     Types: Marijuana [place])    Sexual activity: Yes     Partners: Female   Other Topics Concern    Not on file   Social History Narrative    Not on file     Social Determinants of Health     Financial Resource Strain: Not on file   Food Insecurity: Not on file   Transportation Needs: Not on file   Physical Activity: Not on file   Stress: Not on file   Social Connections: Not on file   Intimate Partner Violence: Not on file   Housing Stability: Not on file       Previous Medications    ACETAMINOPHEN (APAP EXTRA STRENGTH) 500 MG TABLET    Take 1 tablet by mouth every 6 hours as needed for Pain    DOXYCYCLINE MONOHYDRATE (ADOXA) 100 MG TABLET    Take 1 tablet by mouth 2 times daily for 10 days    HYDROCODONE-ACETAMINOPHEN (NORCO) 5-325 MG PER TABLET    Take 1 tablet by mouth every 6 hours as needed for Pain. KETOROLAC (TORADOL) 10 MG TABLET    Take 1 tablet by mouth every 6 hours as needed for Pain You may use this for break through pain if Hycet wears off before next dose. After out of Hycet use the remainder of this prescription. Then transition to Tylenol. Do not use this with ibuprofen       Allergies: Allergies as of 12/28/2022    (No Known Allergies)       Review of Systems       See HPI for further details. At least 4 systems reviewed and are otherwise negative unless noted in the history of present illness. Constitutional: Denies fever or chills   Musculoskeletal: Denies  joint pain   Integument: admits rash   Neurologic: Denies focal weakness or sensory changes     Physical Exam       Vitals:    12/28/22 1028   BP: (!) 123/96   Pulse: 85   Resp: 18   Temp: 98.2 °F (36.8 °C)   TempSrc: Oral   SpO2: 100%   Weight: 259 lb (117.5 kg)   Height: 6' 4\" (1.93 m)       Constitutional: No acute distress, Non-toxic appearance; well nourished    Extremities: Normal range of motion, Intact distal pulses, No edema, with mild fluctuance to right middle finger laterally with mild tenderness; no Felon    Neurologic: Alert & oriented x 3,; Normal motor function, Normal sensory function, No focal defecits    Skin: Warm, Dry, No erythema, No rash    Psychiatric: Alert and oriented to person, place, and time. Patient maintains good eye contact. Mood and affect were normal. Concentration appeared normal      Diagnostic Studies       Please see electronic medical record for any tests performed in the ED.      Labs:    Labs Reviewed - No data to display    Radiology:    No orders to display           Emergency Department Procedures         ED Course and MDM       Maria De Jesus Hand is a 33 y.o. male who presented to the emergency department with a chief complaint of finger swelling and pain     Patient was seen, history and physical exam was performed. Patient remains stable here in the emergency department. Patient's  physical examination findings were reviewed and were reassuring. Labs Reviewed - No data to display  Medications - No data to display  New Prescriptions    CLINDAMYCIN (CLEOCIN) 150 MG CAPSULE    Take 2 capsules by mouth 4 times daily for 10 days    NAPROXEN (NAPROSYN) 500 MG TABLET    Take 1 tablet by mouth 2 times daily         Counseling     The emergency provider has spoken with the patient and discussed today's findings, in addition to providing specific details for the plan of care. Questions are answered and there is agreement with the plan. Patient was given clear discharge and followup instructions including return to the ER immediately for worsening concerns. Patient is advised to followup with primary care physician and/or referred physician in the next one to two days or sooner if worsening and to return to the ER immediately as above with any concerns. Usual and customary treatment and medication side effects and warning signs discussed. Patient was discharged from emergency department in good condition with all questions answered and patient has demonstrated capacity to understand these instructions and to follow up. Refer to the patient's discharge summary for more information on plan and follow-up. I have explained to the patient in appropriate terminology our work-up in the ED and their diagnosis. I have also given anticipatory guidance and expectant management of their condition as an outpatient as per my custom. They are instructed to return to the ED if their condition deteriorates in any way    Of note, this patient's blood pressure was elevated here in the department however they are asymptomatic at this time.  Patient educated on how to check blood pressure at home and urged to closely follow up with a primary care provider for their blood pressure. This patient was seen under the direct supervision of the attending physician who was available for consultation. Differential Diagnosis   Paronychia versus cellulitis versus felon      DECISION to ADMIT / DISCHARGE:     11:18 AM    Clinical Impression       1.   1. Paronychia of finger of right hand        Disposition       All results were shared with the patient, medical decision making was discussed and all questions were answered, and he agreed to assessment and plan. Patient was DISCHARGED from the hospital. Based on the reassuring ED workup and patient's stable vital signs, I feel the patient may be safely discharged home. At this point in time, I believe the patient has the mental capacity to make medical decisions.        Odilia Dancer, [facility]  12/28/22 1118

## 2023-12-23 ENCOUNTER — HOSPITAL ENCOUNTER (EMERGENCY)
Age: 28
Discharge: HOME | End: 2023-12-23
Payer: COMMERCIAL

## 2023-12-23 ENCOUNTER — APPOINTMENT (OUTPATIENT)
Dept: CT IMAGING | Age: 28
End: 2023-12-23
Payer: COMMERCIAL

## 2023-12-23 VITALS
BODY MASS INDEX: 38.89 KG/M2 | SYSTOLIC BLOOD PRESSURE: 150 MMHG | WEIGHT: 303 LBS | TEMPERATURE: 98 F | HEIGHT: 74 IN | DIASTOLIC BLOOD PRESSURE: 98 MMHG | OXYGEN SATURATION: 100 % | RESPIRATION RATE: 18 BRPM | HEART RATE: 93 BPM

## 2023-12-23 DIAGNOSIS — L73.2 HIDRADENITIS SUPPURATIVA: Primary | ICD-10-CM

## 2023-12-23 LAB
ANION GAP SERPL CALC-SCNC: 13 MEQ/L (ref 8–16)
BASOPHILS ABSOLUTE: 0 THOU/MM3 (ref 0–0.1)
BASOPHILS NFR BLD AUTO: 0.3 %
BUN SERPL-MCNC: 10 MG/DL (ref 7–22)
CALCIUM SERPL-MCNC: 9.8 MG/DL (ref 8.5–10.5)
CHLORIDE SERPL-SCNC: 99 MEQ/L (ref 98–111)
CO2 SERPL-SCNC: 25 MEQ/L (ref 23–33)
CREAT SERPL-MCNC: 1.1 MG/DL (ref 0.4–1.2)
DEPRECATED RDW RBC AUTO: 43.8 FL (ref 35–45)
EOSINOPHIL NFR BLD AUTO: 2.1 %
EOSINOPHILS ABSOLUTE: 0.3 THOU/MM3 (ref 0–0.4)
ERYTHROCYTE [DISTWIDTH] IN BLOOD BY AUTOMATED COUNT: 13.2 % (ref 11.5–14.5)
GFR SERPL CREATININE-BSD FRML MDRD: > 60 ML/MIN/1.73M2
GLUCOSE SERPL-MCNC: 83 MG/DL (ref 70–108)
HCT VFR BLD AUTO: 44.4 % (ref 42–52)
HGB BLD-MCNC: 14.7 GM/DL (ref 14–18)
IMM GRANULOCYTES # BLD AUTO: 0.04 THOU/MM3 (ref 0–0.07)
IMM GRANULOCYTES NFR BLD AUTO: 0.3 %
LYMPHOCYTES ABSOLUTE: 2.6 THOU/MM3 (ref 1–4.8)
LYMPHOCYTES NFR BLD AUTO: 17.4 %
MCH RBC QN AUTO: 30.1 PG (ref 26–33)
MCHC RBC AUTO-ENTMCNC: 33.1 GM/DL (ref 32.2–35.5)
MCV RBC AUTO: 90.8 FL (ref 80–94)
MONOCYTES ABSOLUTE: 1 THOU/MM3 (ref 0.4–1.3)
MONOCYTES NFR BLD AUTO: 7 %
NEUTROPHILS NFR BLD AUTO: 72.9 %
NRBC BLD AUTO-RTO: 0 /100 WBC
OSMOLALITY SERPL CALC.SUM OF ELEC: 272 MOSMOL/KG (ref 275–300)
PLATELET # BLD AUTO: 290 THOU/MM3 (ref 130–400)
PMV BLD AUTO: 9.3 FL (ref 9.4–12.4)
POTASSIUM SERPL-SCNC: 3.9 MEQ/L (ref 3.5–5.2)
RBC # BLD AUTO: 4.89 MILL/MM3 (ref 4.7–6.1)
SEGMENTED NEUTROPHILS ABSOLUTE COUNT: 10.7 THOU/MM3 (ref 1.8–7.7)
SODIUM SERPL-SCNC: 137 MEQ/L (ref 135–145)
WBC # BLD AUTO: 14.7 THOU/MM3 (ref 4.8–10.8)

## 2023-12-23 PROCEDURE — 96365 THER/PROPH/DIAG IV INF INIT: CPT

## 2023-12-23 PROCEDURE — 96375 TX/PRO/DX INJ NEW DRUG ADDON: CPT

## 2023-12-23 PROCEDURE — 73201 CT UPPER EXTREMITY W/DYE: CPT

## 2023-12-23 PROCEDURE — 6370000000 HC RX 637 (ALT 250 FOR IP): Performed by: NURSE PRACTITIONER

## 2023-12-23 PROCEDURE — 85025 COMPLETE CBC W/AUTO DIFF WBC: CPT

## 2023-12-23 PROCEDURE — 99285 EMERGENCY DEPT VISIT HI MDM: CPT

## 2023-12-23 PROCEDURE — 6360000004 HC RX CONTRAST MEDICATION: Performed by: NURSE PRACTITIONER

## 2023-12-23 PROCEDURE — 6360000002 HC RX W HCPCS: Performed by: NURSE PRACTITIONER

## 2023-12-23 PROCEDURE — 96367 TX/PROPH/DG ADDL SEQ IV INF: CPT

## 2023-12-23 PROCEDURE — 96376 TX/PRO/DX INJ SAME DRUG ADON: CPT

## 2023-12-23 PROCEDURE — 2580000003 HC RX 258: Performed by: NURSE PRACTITIONER

## 2023-12-23 PROCEDURE — 80048 BASIC METABOLIC PNL TOTAL CA: CPT

## 2023-12-23 PROCEDURE — 36415 COLL VENOUS BLD VENIPUNCTURE: CPT

## 2023-12-23 PROCEDURE — 96366 THER/PROPH/DIAG IV INF ADDON: CPT

## 2023-12-23 RX ORDER — SULFAMETHOXAZOLE AND TRIMETHOPRIM 800; 160 MG/1; MG/1
1 TABLET ORAL 2 TIMES DAILY
Qty: 20 TABLET | Refills: 0 | Status: SHIPPED | OUTPATIENT
Start: 2023-12-23 | End: 2024-01-02

## 2023-12-23 RX ORDER — MORPHINE SULFATE 4 MG/ML
4 INJECTION, SOLUTION INTRAMUSCULAR; INTRAVENOUS ONCE
Status: COMPLETED | OUTPATIENT
Start: 2023-12-23 | End: 2023-12-23

## 2023-12-23 RX ORDER — OXYCODONE HYDROCHLORIDE AND ACETAMINOPHEN 5; 325 MG/1; MG/1
1 TABLET ORAL ONCE
Status: COMPLETED | OUTPATIENT
Start: 2023-12-23 | End: 2023-12-23

## 2023-12-23 RX ORDER — HYDROCODONE BITARTRATE AND ACETAMINOPHEN 5; 325 MG/1; MG/1
1 TABLET ORAL EVERY 6 HOURS PRN
Qty: 12 TABLET | Refills: 0 | Status: SHIPPED | OUTPATIENT
Start: 2023-12-23 | End: 2023-12-26

## 2023-12-23 RX ORDER — CEPHALEXIN 500 MG/1
500 CAPSULE ORAL 4 TIMES DAILY
Qty: 28 CAPSULE | Refills: 0 | Status: SHIPPED | OUTPATIENT
Start: 2023-12-23 | End: 2023-12-30

## 2023-12-23 RX ORDER — ONDANSETRON 2 MG/ML
4 INJECTION INTRAMUSCULAR; INTRAVENOUS ONCE
Status: COMPLETED | OUTPATIENT
Start: 2023-12-23 | End: 2023-12-23

## 2023-12-23 RX ADMIN — VANCOMYCIN HYDROCHLORIDE 2500 MG: 1 INJECTION, POWDER, LYOPHILIZED, FOR SOLUTION INTRAVENOUS at 20:33

## 2023-12-23 RX ADMIN — MORPHINE SULFATE 4 MG: 4 INJECTION, SOLUTION INTRAMUSCULAR; INTRAVENOUS at 19:32

## 2023-12-23 RX ADMIN — PIPERACILLIN AND TAZOBACTAM 4500 MG: 4; .5 INJECTION, POWDER, FOR SOLUTION INTRAVENOUS at 19:37

## 2023-12-23 RX ADMIN — MORPHINE SULFATE 4 MG: 4 INJECTION, SOLUTION INTRAMUSCULAR; INTRAVENOUS at 21:36

## 2023-12-23 RX ADMIN — IOPAMIDOL 80 ML: 755 INJECTION, SOLUTION INTRAVENOUS at 20:37

## 2023-12-23 RX ADMIN — OXYCODONE HYDROCHLORIDE AND ACETAMINOPHEN 1 TABLET: 5; 325 TABLET ORAL at 23:51

## 2023-12-23 RX ADMIN — ONDANSETRON 4 MG: 2 INJECTION INTRAMUSCULAR; INTRAVENOUS at 19:30

## 2023-12-23 NOTE — ED TRIAGE NOTES
Pt presents to the ED c/o a possible abscess in the right armpit. Pt has a large area in the right armpit that is swollen, red and warm to the touch. Pt states that the pain has kept him up all night and has been worsening. Pt has been taking 400mg motrin tablets at home. Pt is alert and oriented, respirations are equal and unlabored.

## 2023-12-28 PROBLEM — E66.9 SIMPLE OBESITY: Status: ACTIVE | Noted: 2019-10-29

## 2023-12-28 PROBLEM — L21.0 SEBORRHEA CAPITIS: Status: ACTIVE | Noted: 2019-10-29

## 2023-12-28 PROBLEM — F33.9: Status: ACTIVE | Noted: 2020-06-17

## 2023-12-28 NOTE — ED PROVIDER NOTES
blank)  Medications   morphine (PF) injection 4 mg (4 mg IntraVENous Given 12/23/23 1932)   ondansetron (ZOFRAN) injection 4 mg (4 mg IntraVENous Given 12/23/23 1930)   vancomycin (VANCOCIN) 2,500 mg in sodium chloride 0.9 % 500 mL IVPB (0 mg IntraVENous Stopped 12/23/23 2351)   piperacillin-tazobactam (ZOSYN) 4,500 mg in sodium chloride 0.9 % 100 mL IVPB (mini-bag) (0 mg IntraVENous Stopped 12/23/23 2032)   iopamidol (ISOVUE-370) 76 % injection 80 mL (80 mLs IntraVENous Given 12/23/23 2037)   morphine (PF) injection 4 mg (4 mg IntraVENous Given 12/23/23 2136)   oxyCODONE-acetaminophen (PERCOCET) 5-325 MG per tablet 1 tablet (1 tablet Oral Given 12/23/23 2351)         CONSULTS:  None    PROCEDURES: (None if blank)  Procedures:     CRITICAL CARE: (None if blank)      DISCHARGE PRESCRIPTIONS: (None if blank)  Discharge Medication List as of 12/23/2023 11:33 PM        START taking these medications    Details   sulfamethoxazole-trimethoprim (BACTRIM DS) 800-160 MG per tablet Take 1 tablet by mouth 2 times daily for 10 days, Disp-20 tablet, R-0Print      cephALEXin (KEFLEX) 500 MG capsule Take 1 capsule by mouth 4 times daily for 7 days, Disp-28 capsule, R-0Print             FINAL IMPRESSION      1. Hidradenitis suppurativa          DISPOSITION/PLAN   DISPOSITION Decision To Discharge 12/23/2023 11:55:06 PM      OUTPATIENT FOLLOW UP THE PATIENT:  Ramiro Joshi, [address] [facility] [address] [facility] 75 [facility]  [phone]    Schedule an appointment as soon as possible for a visit in 3 days  For follow up    [facility]  [address] [facility]    Schedule an appointment as soon as possible for a visit in 2 days  For follow up      MARK Morataya CNP? MARK Plasencia Asp, CNP  12/28/23 7206

## 2024-01-02 ENCOUNTER — OFFICE VISIT (OUTPATIENT)
Dept: SURGERY | Age: 29
End: 2024-01-02
Payer: COMMERCIAL

## 2024-01-02 VITALS
OXYGEN SATURATION: 98 % | TEMPERATURE: 97.3 F | HEART RATE: 88 BPM | DIASTOLIC BLOOD PRESSURE: 82 MMHG | HEIGHT: 74 IN | WEIGHT: 299.1 LBS | RESPIRATION RATE: 18 BRPM | BODY MASS INDEX: 38.39 KG/M2 | SYSTOLIC BLOOD PRESSURE: 124 MMHG

## 2024-01-02 DIAGNOSIS — L73.2 HIDRADENITIS: Primary | ICD-10-CM

## 2024-01-02 PROCEDURE — G8417 CALC BMI ABV UP PARAM F/U: HCPCS | Performed by: SURGERY

## 2024-01-02 PROCEDURE — 99202 OFFICE O/P NEW SF 15 MIN: CPT | Performed by: SURGERY

## 2024-01-02 PROCEDURE — G8427 DOCREV CUR MEDS BY ELIG CLIN: HCPCS | Performed by: SURGERY

## 2024-01-02 PROCEDURE — G8484 FLU IMMUNIZE NO ADMIN: HCPCS | Performed by: SURGERY

## 2024-01-02 PROCEDURE — 1036F TOBACCO NON-USER: CPT | Performed by: SURGERY

## 2024-01-02 RX ORDER — DOXYCYCLINE HYCLATE 100 MG
100 TABLET ORAL 2 TIMES DAILY
Qty: 20 TABLET | Refills: 2 | Status: SHIPPED | OUTPATIENT
Start: 2024-01-02 | End: 2024-02-01

## 2024-01-03 DIAGNOSIS — L73.2 HIDRADENITIS: Primary | ICD-10-CM

## 2024-01-10 ASSESSMENT — ENCOUNTER SYMPTOMS: GASTROINTESTINAL NEGATIVE: 1

## 2024-05-20 ENCOUNTER — HOSPITAL ENCOUNTER (EMERGENCY)
Age: 29
Discharge: HOME | End: 2024-05-20
Payer: COMMERCIAL

## 2024-05-20 VITALS
WEIGHT: 289 LBS | DIASTOLIC BLOOD PRESSURE: 90 MMHG | OXYGEN SATURATION: 100 % | BODY MASS INDEX: 37.11 KG/M2 | RESPIRATION RATE: 18 BRPM | SYSTOLIC BLOOD PRESSURE: 161 MMHG | TEMPERATURE: 98 F | HEART RATE: 72 BPM

## 2024-05-20 DIAGNOSIS — G89.18 PAIN FOLLOWING ORAL SURGERY: Primary | ICD-10-CM

## 2024-05-20 PROCEDURE — 6370000000 HC RX 637 (ALT 250 FOR IP): Performed by: NURSE PRACTITIONER

## 2024-05-20 PROCEDURE — 99283 EMERGENCY DEPT VISIT LOW MDM: CPT

## 2024-05-20 RX ORDER — HYDROCODONE BITARTRATE AND ACETAMINOPHEN 5; 325 MG/1; MG/1
1 TABLET ORAL ONCE
Status: COMPLETED | OUTPATIENT
Start: 2024-05-20 | End: 2024-05-20

## 2024-05-20 RX ADMIN — Medication 5 ML: at 13:24

## 2024-05-20 RX ADMIN — HYDROCODONE BITARTRATE AND ACETAMINOPHEN 1 TABLET: 5; 325 TABLET ORAL at 13:23

## 2024-05-20 ASSESSMENT — PAIN SCALES - GENERAL: PAINLEVEL_OUTOF10: 10

## 2024-05-20 ASSESSMENT — PAIN - FUNCTIONAL ASSESSMENT: PAIN_FUNCTIONAL_ASSESSMENT: 0-10

## 2024-05-20 NOTE — ED PROVIDER NOTES
[facility] Emergency Department    CHIEF COMPLAINT       Chief Complaint   Patient presents with    Pain     Tooth extraction no pain medication        Nurses Notes reviewed and I agree except as noted in the HPI.    HISTORY OF PRESENT ILLNESS   Endy Weeks is a 29 y.o. male who presents to the ED for evaluation of post dental procedure pain. Patient states he had a 6 teeth extracted during a dental procedure this morning at 0830. This procedure was performed under general anesthesia and per the dentist patient left the office without an complications. Patient states he was unable to fill his pain medication prescription and is now in 10/10 pain as a result. Patient states he was prescribed percocet but in unsure of the dose.       HPI was provided by the patient.         PAST MEDICAL HISTORY     Past Medical History:   Diagnosis Date    Asthma        SURGICALHISTORY      has a past surgical history that includes Tonsillectomy (2020) and Incision and Drainage of Tonsillar Absce (N/A, 2020).    CURRENT MEDICATIONS       Discharge Medication List as of 2024  1:19 PM        CONTINUE these medications which have NOT CHANGED    Details   acetaminophen (APAP EXTRA STRENGTH) 500 MG tablet Take 1 tablet by mouth every 6 hours as needed for Pain, Disp-30 tablet, R-0Normal             ALLERGIES     has No Known Allergies.    FAMILY HISTORY     He indicated that his mother is alive. He indicated that his father is .   family history is not on file.    SOCIAL HISTORY       Social History     Socioeconomic History    Marital status: Single     Spouse name: Not on file    Number of children: Not on file    Years of education: Not on file    Highest education level: Not on file   Occupational History    Not on file   Tobacco Use    Smoking status: Former     Types: Cigars    Smokeless tobacco: Never   Vaping Use    Vaping Use: Never used   Substance and Sexual Activity    Alcohol use: No    Drug use:

## 2024-05-20 NOTE — ED TRIAGE NOTES
Presents to ER with concern of pain after tooth extraction today. [facility] [address] [facility] unable to fill script for percocet until tomorrow. He reports he was given lidocaine mouthwash but the pain is unbearable at this time.

## 2024-10-06 ENCOUNTER — HOSPITAL ENCOUNTER (EMERGENCY)
Age: 29
Discharge: HOME | End: 2024-10-06
Payer: COMMERCIAL

## 2024-10-06 VITALS
SYSTOLIC BLOOD PRESSURE: 135 MMHG | HEIGHT: 76 IN | BODY MASS INDEX: 36.53 KG/M2 | HEART RATE: 96 BPM | WEIGHT: 300 LBS | RESPIRATION RATE: 18 BRPM | DIASTOLIC BLOOD PRESSURE: 73 MMHG | TEMPERATURE: 98.6 F | OXYGEN SATURATION: 95 %

## 2024-10-06 DIAGNOSIS — H66.004 RECURRENT ACUTE SUPPURATIVE OTITIS MEDIA OF RIGHT EAR WITHOUT SPONTANEOUS RUPTURE OF TYMPANIC MEMBRANE: Primary | ICD-10-CM

## 2024-10-06 DIAGNOSIS — L73.9 FOLLICULITIS: ICD-10-CM

## 2024-10-06 LAB
FLUAV RNA RESP QL NAA+PROBE: NOT DETECTED
FLUBV RNA RESP QL NAA+PROBE: NOT DETECTED
S PYO AG THROAT QL: NEGATIVE
S PYO THROAT QL CULT: NORMAL
SARS-COV-2 RNA RESP QL NAA+PROBE: NOT DETECTED

## 2024-10-06 PROCEDURE — 6370000000 HC RX 637 (ALT 250 FOR IP): Performed by: PHYSICIAN ASSISTANT

## 2024-10-06 PROCEDURE — 87636 SARSCOV2 & INF A&B AMP PRB: CPT

## 2024-10-06 PROCEDURE — 99283 EMERGENCY DEPT VISIT LOW MDM: CPT

## 2024-10-06 PROCEDURE — 87880 STREP A ASSAY W/OPTIC: CPT

## 2024-10-06 PROCEDURE — 87070 CULTURE OTHR SPECIMN AEROBIC: CPT

## 2024-10-06 RX ORDER — MUPIROCIN 20 MG/G
OINTMENT TOPICAL
Qty: 22 G | Refills: 0 | Status: SHIPPED | OUTPATIENT
Start: 2024-10-06 | End: 2024-10-13

## 2024-10-06 RX ORDER — CEFUROXIME AXETIL 250 MG/1
500 TABLET ORAL ONCE
Status: COMPLETED | OUTPATIENT
Start: 2024-10-06 | End: 2024-10-06

## 2024-10-06 RX ORDER — CEFUROXIME AXETIL 500 MG/1
500 TABLET ORAL 2 TIMES DAILY
Qty: 20 TABLET | Refills: 0 | Status: SHIPPED | OUTPATIENT
Start: 2024-10-06 | End: 2024-10-16

## 2024-10-06 RX ADMIN — CEFUROXIME AXETIL 500 MG: 250 TABLET ORAL at 17:51

## 2024-10-06 ASSESSMENT — PAIN SCALES - GENERAL: PAINLEVEL_OUTOF10: 8

## 2024-10-06 ASSESSMENT — PAIN - FUNCTIONAL ASSESSMENT: PAIN_FUNCTIONAL_ASSESSMENT: 0-10

## 2024-10-06 NOTE — ED TRIAGE NOTES
Pt presents to the ed with c/o sore throat and ear pain x2 days. Pt states that the pain is worse at night time.    None

## 2024-10-08 LAB — BACTERIA THROAT AEROBE CULT: NORMAL

## 2024-10-24 ENCOUNTER — HOSPITAL ENCOUNTER (EMERGENCY)
Age: 29
Discharge: HOME | End: 2024-10-24
Attending: STUDENT IN AN ORGANIZED HEALTH CARE EDUCATION/TRAINING PROGRAM
Payer: COMMERCIAL

## 2024-10-24 VITALS
BODY MASS INDEX: 36.52 KG/M2 | TEMPERATURE: 98.1 F | WEIGHT: 300 LBS | DIASTOLIC BLOOD PRESSURE: 87 MMHG | SYSTOLIC BLOOD PRESSURE: 135 MMHG | OXYGEN SATURATION: 99 % | HEART RATE: 95 BPM

## 2024-10-24 DIAGNOSIS — R30.0 DYSURIA: ICD-10-CM

## 2024-10-24 DIAGNOSIS — R11.2 NAUSEA VOMITING AND DIARRHEA: Primary | ICD-10-CM

## 2024-10-24 DIAGNOSIS — R19.7 NAUSEA VOMITING AND DIARRHEA: Primary | ICD-10-CM

## 2024-10-24 LAB
BACTERIA URNS QL MICRO: ABNORMAL /HPF
BILIRUB UR QL STRIP.AUTO: NEGATIVE
CASTS #/AREA URNS LPF: ABNORMAL /LPF
CASTS 2: ABNORMAL /LPF
CHARACTER UR: CLEAR
COLOR, UA: YELLOW
CRYSTALS URNS MICRO: ABNORMAL
EPITHELIAL CELLS, UA: ABNORMAL /HPF
FLUAV RNA RESP QL NAA+PROBE: NOT DETECTED
FLUBV RNA RESP QL NAA+PROBE: NOT DETECTED
GLUCOSE UR QL STRIP.AUTO: NEGATIVE MG/DL
HGB UR QL STRIP.AUTO: NEGATIVE
KETONES UR QL STRIP.AUTO: ABNORMAL
MISCELLANEOUS 2: ABNORMAL
NITRITE UR QL STRIP: NEGATIVE
PH UR STRIP.AUTO: 5.5 [PH] (ref 5–9)
PROT UR STRIP.AUTO-MCNC: 30 MG/DL
RBC URINE: ABNORMAL /HPF
RENAL EPI CELLS #/AREA URNS HPF: ABNORMAL /[HPF]
S PYO AG THROAT QL: NEGATIVE
S PYO THROAT QL CULT: NORMAL
SARS-COV-2 RNA RESP QL NAA+PROBE: NOT DETECTED
SP GR UR REFRACT.AUTO: > 1.03 (ref 1–1.03)
UROBILINOGEN, URINE: 2 EU/DL (ref 0–1)
WBC #/AREA URNS HPF: ABNORMAL /HPF
WBC #/AREA URNS HPF: ABNORMAL /[HPF]
YEAST LIKE FUNGI URNS QL MICRO: ABNORMAL

## 2024-10-24 PROCEDURE — 87880 STREP A ASSAY W/OPTIC: CPT

## 2024-10-24 PROCEDURE — 6370000000 HC RX 637 (ALT 250 FOR IP)

## 2024-10-24 PROCEDURE — 99283 EMERGENCY DEPT VISIT LOW MDM: CPT

## 2024-10-24 PROCEDURE — 87070 CULTURE OTHR SPECIMN AEROBIC: CPT

## 2024-10-24 PROCEDURE — 6370000000 HC RX 637 (ALT 250 FOR IP): Performed by: STUDENT IN AN ORGANIZED HEALTH CARE EDUCATION/TRAINING PROGRAM

## 2024-10-24 PROCEDURE — 87086 URINE CULTURE/COLONY COUNT: CPT

## 2024-10-24 PROCEDURE — 87636 SARSCOV2 & INF A&B AMP PRB: CPT

## 2024-10-24 PROCEDURE — 81001 URINALYSIS AUTO W/SCOPE: CPT

## 2024-10-24 RX ORDER — ONDANSETRON 4 MG/1
4 TABLET, ORALLY DISINTEGRATING ORAL 3 TIMES DAILY PRN
Qty: 21 TABLET | Refills: 0 | Status: SHIPPED | OUTPATIENT
Start: 2024-10-24

## 2024-10-24 RX ORDER — ACETAMINOPHEN 325 MG/1
650 TABLET ORAL ONCE
Status: COMPLETED | OUTPATIENT
Start: 2024-10-24 | End: 2024-10-24

## 2024-10-24 RX ORDER — ONDANSETRON 4 MG/1
4 TABLET, ORALLY DISINTEGRATING ORAL ONCE
Status: COMPLETED | OUTPATIENT
Start: 2024-10-24 | End: 2024-10-24

## 2024-10-24 RX ADMIN — Medication 5 ML: at 09:53

## 2024-10-24 RX ADMIN — ACETAMINOPHEN 650 MG: 325 TABLET ORAL at 09:45

## 2024-10-24 RX ADMIN — ONDANSETRON 4 MG: 4 TABLET, ORALLY DISINTEGRATING ORAL at 09:45

## 2024-10-24 ASSESSMENT — PAIN SCALES - GENERAL: PAINLEVEL_OUTOF10: 6

## 2024-10-24 ASSESSMENT — PAIN DESCRIPTION - LOCATION: LOCATION: THROAT

## 2024-10-24 ASSESSMENT — PAIN - FUNCTIONAL ASSESSMENT: PAIN_FUNCTIONAL_ASSESSMENT: 0-10

## 2024-10-24 NOTE — ED PROVIDER NOTES
[facility] EMERGENCY DEPARTMENT  EMERGENCY DEPARTMENT ENCOUNTER          Pt Name: Endy Weeks  MRN: [id number]  Birthdate 1995  Date of evaluation: 10/24/2024  Resident Physician: Josh Castellon MD EM Resident PGY-3  Attending Physician: Luca Marshall DO      CHIEF COMPLAINT       Chief Complaint   Patient presents with    Vomiting    Sore Throat         HISTORY OF PRESENT ILLNESS    HPI  Endy Weeks is a 29 y.o. male who presents to the emergency department from home, as a walk in to the ED lobby for evaluation of abdominal pain nausea, vomiting, sore throat, dysuria.    Patient states that he began to have abdominal pain with nausea 3 days ago, since states that he has had small amount of blood in his emesis.  Patient diarrhea today with no blood per rectum.  Patient states that he has also had sore throat with some minor swelling, pain with swallowing.  Patient had previous history of tonsillectomy 4 years prior as well as peritonsillar abscess.  Patient also endorses dysuria so burns when he pees.  Denies any, penile discharge or testicular pain.  Patient states he was seen previously at [facility] for his abdominal pain unknown diagnosis but was sent home on 10 days of antibiotics.  States that he completed these a few days ago.      The patient has no other acute complaints at this time.    ROS negative except as stated above.    PAST MEDICAL AND SURGICAL HISTORY     Past Medical History:   Diagnosis Date    Asthma      Past Surgical History:   Procedure Laterality Date    INCISION AND DRAINAGE OF TONSILLAR ABSCESS N/A 6/7/2020    TONSIL INCISION AND DRAINAGE, with TONSILECTOMY performed by Eliel Price MD at [facility]    TONSILLECTOMY  06/07/2020         MEDICATIONS   No current facility-administered medications for this encounter.    Current Outpatient Medications:     ondansetron (ZOFRAN-ODT) 4 MG disintegrating tablet, Take 1 tablet by mouth 3 times daily as needed  evaluation given strict turn precautions emergency department and agreeable with plan for discharge home.    Shared Decision-Making was performed, disposition discussed with the patient/family and questions answered.    Outpatient follow up (If applicable):  Cara Ortiz APRN - CNP  [address] [facility] [address]  [phone]    Schedule an appointment as soon as possible for a visit in 2 days      [facility] EMERGENCY DEPT  [address] [place] [id number]  [phone]  Go to   As needed, If symptoms worsen    The results of pertinent diagnostic studies and exam findings were discussed with the patient/surrogate.   The patient’s provisional diagnosis and plan of care were discussed with the patient and present family who expressed understanding.   Medications were reviewed and indications and risks of medications were discussed with the patient/family present.   Strict return precautions and follow up instructions were discussed with the patient prior to discharge, with which the patient agrees.     FINAL DIAGNOSES:  Final diagnoses:   Nausea vomiting and diarrhea   Dysuria       Condition: condition: stable  Dispo: Discharge to home  DISPOSITION Decision To Discharge 10/24/2024 11:19:36 AM           This transcription was electronically signed. It was dictated by use of voice recognition software and electronically transcribed. The transcription may contain errors not detected in proofreading.

## 2024-10-24 NOTE — DISCHARGE INSTRUCTIONS
You were seen and evaluated emergency department today for concern of sore throat, nausea vomiting diarrhea and painful urination.  You were negative for COVID-19, influenza, strep throat.  Your urine showed potential infection you have chosen to wait until the urine culture shows if there is true infection.    Fill your prescription for Zofran take as needed at home for nausea and vomiting.  You may alternate between Tylenol and Motrin for pain.    Follow-up primary care provider further evaluation and management over the next few days.    Return to emergency department anytime for acute or worrisome changes.

## 2024-10-24 NOTE — ED TRIAGE NOTES
Presents to ED with c/o throat pain and emesis. Patient states symptoms started about 3 days ago. Alert and oriented. Respirations easy and unlabored.

## 2024-10-26 LAB
BACTERIA THROAT AEROBE CULT: NORMAL
BACTERIA UR CULT: NORMAL

## (undated) DEVICE — CONTAINER VACUTAINER ANAER CULTURE SWAB

## (undated) DEVICE — SYRINGE,EAR/ULCER, 2 OZ, STERILE: Brand: MEDLINE

## (undated) DEVICE — SPONGE,TONSIL,DBL STRNG,XRAY,SM,7/8",ST: Brand: MEDLINE INDUSTRIES, INC.

## (undated) DEVICE — APPLIER LIG CLP M L11IN TI STR RNG HNDL FOR 20 CLP DISP

## (undated) DEVICE — GLOVE ORANGE PI 7   MSG9070

## (undated) DEVICE — SYRINGE IRRIG 60ML SFT PLIABLE BLB EZ TO GRP 1 HND USE W/

## (undated) DEVICE — GOWN,SIRUS,NON REINFRCD,LARGE,SET IN SL: Brand: MEDLINE

## (undated) DEVICE — BLADE ES L4IN INSUL EDGE

## (undated) DEVICE — Device

## (undated) DEVICE — CORD ES L12FT BPLR FRCP

## (undated) DEVICE — CONTAINER,SPECIMEN,PNEU TUBE,4OZ,OR STRL: Brand: MEDLINE

## (undated) DEVICE — GARMENT,MEDLINE,DVT,INT,CALF,MED, GEN2: Brand: MEDLINE

## (undated) DEVICE — EVAC 70 XTRA WAND: Brand: COBLATION

## (undated) DEVICE — INTENDED FOR TISSUE SEPARATION, AND OTHER PROCEDURES THAT REQUIRE A SHARP SURGICAL BLADE TO PUNCTURE OR CUT.: Brand: BARD-PARKER ® CARBON RIB-BACK BLADES

## (undated) DEVICE — COAGULATOR SUCT 10FR LAIN FTSWCH ACTIVATION DISP VALLEYLAB

## (undated) DEVICE — SYRINGE CATH TIP 50ML

## (undated) DEVICE — COVER ARMBRD W13XL28.5IN IMPERV BLU FOR OP RM